# Patient Record
Sex: FEMALE | Race: WHITE | NOT HISPANIC OR LATINO | Employment: OTHER | URBAN - METROPOLITAN AREA
[De-identification: names, ages, dates, MRNs, and addresses within clinical notes are randomized per-mention and may not be internally consistent; named-entity substitution may affect disease eponyms.]

---

## 2022-08-31 ENCOUNTER — TRANSCRIBE ORDERS (OUTPATIENT)
Dept: ADMINISTRATIVE | Facility: HOSPITAL | Age: 67
End: 2022-08-31

## 2022-08-31 DIAGNOSIS — S83.241A TEAR OF MEDIAL MENISCUS OF RIGHT KNEE, CURRENT, UNSPECIFIED TEAR TYPE, INITIAL ENCOUNTER: Primary | ICD-10-CM

## 2023-05-31 ENCOUNTER — HOSPITAL ENCOUNTER (OUTPATIENT)
Dept: GENERAL RADIOLOGY | Facility: HOSPITAL | Age: 68
Discharge: HOME | End: 2023-05-31
Payer: MEDICARE

## 2023-05-31 ENCOUNTER — PRE-ADMISSION TESTING (OUTPATIENT)
Dept: PREADMISSION TESTING | Facility: HOSPITAL | Age: 68
End: 2023-05-31
Payer: MEDICARE

## 2023-05-31 VITALS — WEIGHT: 167.44 LBS | HEIGHT: 62 IN | BODY MASS INDEX: 30.81 KG/M2

## 2023-05-31 LAB
ANION GAP SERPL CALCULATED.3IONS-SCNC: 9 MMOL/L (ref 5–15)
BUN SERPL-MCNC: 19 MG/DL (ref 8–23)
BUN/CREAT SERPL: 29.2 (ref 7–25)
CALCIUM SPEC-SCNC: 9.6 MG/DL (ref 8.6–10.5)
CHLORIDE SERPL-SCNC: 105 MMOL/L (ref 98–107)
CO2 SERPL-SCNC: 27 MMOL/L (ref 22–29)
CREAT SERPL-MCNC: 0.65 MG/DL (ref 0.57–1)
CRP SERPL-MCNC: <0.3 MG/DL (ref 0–0.5)
DEPRECATED RDW RBC AUTO: 52.1 FL (ref 37–54)
EGFRCR SERPLBLD CKD-EPI 2021: 97.2 ML/MIN/1.73
ERYTHROCYTE [DISTWIDTH] IN BLOOD BY AUTOMATED COUNT: 14.2 % (ref 12.3–15.4)
ERYTHROCYTE [SEDIMENTATION RATE] IN BLOOD: 25 MM/HR (ref 0–30)
GLUCOSE SERPL-MCNC: 113 MG/DL (ref 65–99)
HBA1C MFR BLD: 6 % (ref 4.8–5.6)
HCT VFR BLD AUTO: 41.2 % (ref 34–46.6)
HGB BLD-MCNC: 13.2 G/DL (ref 12–15.9)
MCH RBC QN AUTO: 31.7 PG (ref 26.6–33)
MCHC RBC AUTO-ENTMCNC: 32 G/DL (ref 31.5–35.7)
MCV RBC AUTO: 98.8 FL (ref 79–97)
PLATELET # BLD AUTO: 439 10*3/MM3 (ref 140–450)
PMV BLD AUTO: 9.7 FL (ref 6–12)
POTASSIUM SERPL-SCNC: 4.7 MMOL/L (ref 3.5–5.2)
QT INTERVAL: 430 MS
QTC INTERVAL: 436 MS
RBC # BLD AUTO: 4.17 10*6/MM3 (ref 3.77–5.28)
SODIUM SERPL-SCNC: 141 MMOL/L (ref 136–145)
WBC NRBC COR # BLD: 10.26 10*3/MM3 (ref 3.4–10.8)

## 2023-05-31 PROCEDURE — 93005 ELECTROCARDIOGRAM TRACING: CPT

## 2023-05-31 PROCEDURE — 83036 HEMOGLOBIN GLYCOSYLATED A1C: CPT

## 2023-05-31 PROCEDURE — 36415 COLL VENOUS BLD VENIPUNCTURE: CPT

## 2023-05-31 PROCEDURE — 80048 BASIC METABOLIC PNL TOTAL CA: CPT

## 2023-05-31 PROCEDURE — 85027 COMPLETE CBC AUTOMATED: CPT

## 2023-05-31 PROCEDURE — 71046 X-RAY EXAM CHEST 2 VIEWS: CPT

## 2023-05-31 PROCEDURE — 86140 C-REACTIVE PROTEIN: CPT

## 2023-05-31 PROCEDURE — 85652 RBC SED RATE AUTOMATED: CPT

## 2023-05-31 RX ORDER — BISOPROLOL FUMARATE 5 MG/1
5 TABLET, FILM COATED ORAL DAILY
Status: ON HOLD | COMMUNITY
Start: 2023-05-21

## 2023-05-31 RX ORDER — SPIRONOLACTONE 25 MG/1
25 TABLET ORAL DAILY
Status: ON HOLD | COMMUNITY

## 2023-05-31 NOTE — PAT
Patient's surgeon called in a prescription for Benzol Peroxide 5% wash to [facility].  Patient instructed to  from [facility] pharmacy that was submitted electronically.  Verbal and written instructions given regarding proper use of the Benzoyl Peroxide wash were provided to patient and/or famlily during PAT visit. Patient/family also instructed to complete Benzol Peroxide checklist and return it to Pre-op on the day of surgery.  Patient and/or family verbalized understanding.      Additionally, reinforced with patient to acquire this prescription from the [facility] retail pharmacy before leaving the hospital after PAT visit due to the potential unavailability at local pharmacies.      Patient instructed to drink 20 ounces of Gatorade and it needs to be completed 1 hour (for Main OR patients) or 2 hours (scheduled  section & [facility] patients) before given arrival time for procedure (NO RED Gatorade)    Patient verbalized understanding.    Patient directed to Radiology Department for CXR after Pre Admission Testing Appointment.     Last pacemaker interrogation was 2022, 18% battery, SJM.  Please check in pre-op.

## 2023-06-06 ENCOUNTER — OFFICE VISIT (OUTPATIENT)
Dept: INTERNAL MEDICINE | Facility: CLINIC | Age: 68
End: 2023-06-06
Payer: MEDICARE

## 2023-06-06 VITALS
TEMPERATURE: 97.8 F | BODY MASS INDEX: 30.88 KG/M2 | HEIGHT: 62 IN | DIASTOLIC BLOOD PRESSURE: 76 MMHG | HEART RATE: 74 BPM | OXYGEN SATURATION: 98 % | WEIGHT: 167.8 LBS | SYSTOLIC BLOOD PRESSURE: 122 MMHG | RESPIRATION RATE: 14 BRPM

## 2023-06-06 DIAGNOSIS — Z95.0 PACEMAKER: ICD-10-CM

## 2023-06-06 DIAGNOSIS — Z85.3 HISTORY OF MALIGNANT NEOPLASM OF BREAST: ICD-10-CM

## 2023-06-06 DIAGNOSIS — I10 PRIMARY HYPERTENSION: Primary | ICD-10-CM

## 2023-06-06 DIAGNOSIS — Z95.810 AUTOMATIC IMPLANTABLE CARDIAC DEFIBRILLATOR IN SITU: ICD-10-CM

## 2023-06-06 DIAGNOSIS — M19.90 IDIOPATHIC OSTEOARTHRITIS: ICD-10-CM

## 2023-06-06 DIAGNOSIS — F39 MOOD DISORDER: ICD-10-CM

## 2023-06-06 DIAGNOSIS — M54.2 NECK PAIN: ICD-10-CM

## 2023-06-06 DIAGNOSIS — F41.1 GENERALIZED ANXIETY DISORDER: ICD-10-CM

## 2023-06-06 DIAGNOSIS — M47.812 CERVICAL SPONDYLOSIS: ICD-10-CM

## 2023-06-06 DIAGNOSIS — I10 ESSENTIAL HYPERTENSION: ICD-10-CM

## 2023-06-06 DIAGNOSIS — M25.511 RIGHT SHOULDER PAIN, UNSPECIFIED CHRONICITY: ICD-10-CM

## 2023-06-06 DIAGNOSIS — I42.0 NONISCHEMIC CONGESTIVE CARDIOMYOPATHY: ICD-10-CM

## 2023-06-06 PROBLEM — G89.29 CHRONIC RIGHT SHOULDER PAIN: Status: ACTIVE | Noted: 2023-06-06

## 2023-06-06 RX ORDER — DEXTROAMPHETAMINE SULFATE 10 MG/1
20 TABLET ORAL
Status: ON HOLD | COMMUNITY
Start: 2023-06-05

## 2023-06-06 RX ORDER — DEXTROAMPHETAMINE SULFATE 10 MG/1
20 CAPSULE, EXTENDED RELEASE ORAL EVERY MORNING
Status: ON HOLD | COMMUNITY
Start: 2023-06-05

## 2023-06-06 NOTE — PROGRESS NOTES
"    Office Note     Name: Sara Lorenz    : 1955     MRN: [id number]     Chief Complaint  Establish Care (Prior to joint replacement surgeries)    Subjective     History of Present Illness:  Sara Lorenz is a 67 y.o. female who presents today for establishing care and she is a previous patient of [facility].  She has chronic health problems including cardiomyopathy and osteoarthritis and hypertension and follows with a cardiologist and she has chronic joint problems and has had joint replacement surgery on her right shoulder and has a reverse joint replacement surgery scheduled for next week and will be having home health caring for her as she lives alone.  Her other chronic issues are stable and she has ADHD and anxiety and sees a psychiatrist.  She has a history of breast cancer years ago.  She has a pacemaker and defibrillator implanted.  Past medical history: As above  Social history: Negative tobacco, negative EtOH, she lives alone and is  her ex- is  and she has a special needs daughter lives in a group living facility and she has regular contact with her and she is retired from social work.    Review of Systems   Respiratory:  Negative for cough, shortness of breath and wheezing.    Cardiovascular:  Negative for chest pain and palpitations.   Gastrointestinal:  Negative for blood in stool, diarrhea and vomiting.   Genitourinary:  Negative for dysuria, flank pain and genital sores.     Objective     Vital Signs  /76 (BP Location: Left arm, Patient Position: Sitting, Cuff Size: Adult)   Pulse 74   Temp 97.8 °F (36.6 °C) (Infrared)   Resp 14   Ht 157.5 cm (62\")   Wt 76.1 kg (167 lb 12.8 oz)   SpO2 98%   BMI 30.69 kg/m²   Estimated body mass index is 30.69 kg/m² as calculated from the following:    Height as of this encounter: 157.5 cm (62\").    Weight as of this encounter: 76.1 kg (167 lb 12.8 oz).    [unfilled]    Physical Exam  Vitals and " nursing note reviewed.   HENT:      Head: Normocephalic and atraumatic.   Neck:      Vascular: No carotid bruit.   Cardiovascular:      Rate and Rhythm: Normal rate and regular rhythm.      Heart sounds: Normal heart sounds. No murmur heard.    No gallop.   Pulmonary:      Effort: Pulmonary effort is normal. No respiratory distress.      Breath sounds: No stridor. No wheezing, rhonchi or rales.   Musculoskeletal:      Cervical back: Normal range of motion and neck supple.      Right lower leg: No edema.      Left lower leg: No edema.   Lymphadenopathy:      Cervical: No cervical adenopathy.   Neurological:      Mental Status: She is alert.               Assessment and Plan     Diagnoses and all orders for this visit:    1. Primary hypertension (Primary) stable with current prescription medication and she will continue this    2. Generalized anxiety disorder stable with current prescription medication and she will continue this    3. History of malignant neoplasm of breast    4. Cervical spondylosis stable    5. Automatic implantable cardiac defibrillator in situ stable and is followed by cardiology    6. Essential hypertension stable    7. Mood disorder stable with current prescription medication and she will continue this    8. Idiopathic osteoarthritis stable    9. Neck pain stable    10. Nonischemic congestive cardiomyopathy stable and is followed by cardiology    11. Pacemaker stable and is due to have this replaced with an MRI compatible pacemaker and is due because her battery is due to be replaced also within the next year    12. Right shoulder pain, unspecified chronicity has reverse shoulder replacement surgery scheduled next week with Dr. Harmon and will be having home health caring for her afterwards          Follow Up  Return in about 3 months (around 9/6/2023) for Recheck.    Breanne Benites DO

## 2023-06-08 ENCOUNTER — TELEPHONE (OUTPATIENT)
Dept: INTERNAL MEDICINE | Facility: CLINIC | Age: 68
End: 2023-06-08
Payer: MEDICARE

## 2023-06-08 NOTE — TELEPHONE ENCOUNTER
Caller: Sara Lorenz    Relationship: Self    Best call back number: [phone]    What orders are you requesting (i.e. lab or imaging): CORATID ARTERY TEST     In what timeframe would the patient need to come in: AS SOON AS POSSIBLE     Where will you receive your lab/imaging services: AS CLOSE TO HER AS POSSIBLE    Additional notes: THE PATIENT STATES THAT SHE NEEDS TO HAVE THE TEST DONE BY MONDAY 06/12/23

## 2023-06-09 ENCOUNTER — PATIENT ROUNDING (BHMG ONLY) (OUTPATIENT)
Dept: INTERNAL MEDICINE | Facility: CLINIC | Age: 68
End: 2023-06-09
Payer: MEDICARE

## 2023-06-09 DIAGNOSIS — H54.7 VISION LOSS: Primary | ICD-10-CM

## 2023-06-09 DIAGNOSIS — H34.9 UNSPECIFIED RETINAL VASCULAR OCCLUSION: ICD-10-CM

## 2023-06-09 NOTE — PROGRESS NOTES
A [employer] message has been sent to the patient for patient rounding with [facility].

## 2023-06-09 NOTE — TELEPHONE ENCOUNTER
There would need to be a diagnosis in order to order the test-----what is the reason she is requesting it

## 2023-06-09 NOTE — TELEPHONE ENCOUNTER
Optometrist is concerned because she has been having episodes of eye sight loss. No explanation why this is going on. She is concerned this may be a carotid artery blocked.     Advised pt this will not be done by Monday.

## 2023-06-11 ENCOUNTER — ANESTHESIA EVENT (OUTPATIENT)
Dept: PERIOP | Facility: HOSPITAL | Age: 68
End: 2023-06-11
Payer: MEDICARE

## 2023-06-11 RX ORDER — FAMOTIDINE 10 MG/ML
20 INJECTION, SOLUTION INTRAVENOUS ONCE
Status: CANCELLED | OUTPATIENT
Start: 2023-06-11 | End: 2023-06-11

## 2023-06-11 RX ORDER — SODIUM CHLORIDE 0.9 % (FLUSH) 0.9 %
10 SYRINGE (ML) INJECTION EVERY 12 HOURS SCHEDULED
Status: CANCELLED | OUTPATIENT
Start: 2023-06-11

## 2023-06-12 ENCOUNTER — APPOINTMENT (OUTPATIENT)
Dept: GENERAL RADIOLOGY | Facility: HOSPITAL | Age: 68
End: 2023-06-12
Payer: MEDICARE

## 2023-06-12 ENCOUNTER — ANESTHESIA EVENT CONVERTED (OUTPATIENT)
Dept: ANESTHESIOLOGY | Facility: HOSPITAL | Age: 68
End: 2023-06-12
Payer: MEDICARE

## 2023-06-12 ENCOUNTER — ANESTHESIA (OUTPATIENT)
Dept: PERIOP | Facility: HOSPITAL | Age: 68
End: 2023-06-12
Payer: MEDICARE

## 2023-06-12 ENCOUNTER — HOSPITAL ENCOUNTER (INPATIENT)
Facility: HOSPITAL | Age: 68
LOS: 3 days | Discharge: HOME | End: 2023-06-15
Attending: ORTHOPAEDIC SURGERY | Admitting: ORTHOPAEDIC SURGERY
Payer: MEDICARE

## 2023-06-12 DIAGNOSIS — M25.519 SHOULDER PAIN: ICD-10-CM

## 2023-06-12 DIAGNOSIS — Z96.611 S/P REVERSE TOTAL SHOULDER ARTHROPLASTY, RIGHT: Primary | ICD-10-CM

## 2023-06-12 LAB — POTASSIUM SERPL-SCNC: 4 MMOL/L (ref 3.5–5.2)

## 2023-06-12 PROCEDURE — C1776 JOINT DEVICE (IMPLANTABLE): HCPCS | Performed by: ORTHOPAEDIC SURGERY

## 2023-06-12 PROCEDURE — C1889 IMPLANT/INSERT DEVICE, NOC: HCPCS | Performed by: ORTHOPAEDIC SURGERY

## 2023-06-12 PROCEDURE — A9270 NON-COVERED ITEM OR SERVICE: HCPCS | Performed by: ORTHOPAEDIC SURGERY

## 2023-06-12 PROCEDURE — C1713 ANCHOR/SCREW BN/BN,TIS/BN: HCPCS | Performed by: ORTHOPAEDIC SURGERY

## 2023-06-12 PROCEDURE — 87102 FUNGUS ISOLATION CULTURE: CPT | Performed by: ORTHOPAEDIC SURGERY

## 2023-06-12 PROCEDURE — 87116 MYCOBACTERIA CULTURE: CPT | Performed by: ORTHOPAEDIC SURGERY

## 2023-06-12 PROCEDURE — 25010000002 PHENYLEPHRINE 10 MG/ML SOLUTION 1 ML VIAL: Performed by: ANESTHESIOLOGY

## 2023-06-12 PROCEDURE — 63710000001 OXYCODONE 5 MG TABLET: Performed by: ORTHOPAEDIC SURGERY

## 2023-06-12 PROCEDURE — 63710000001 SERTRALINE 100 MG TABLET: Performed by: INTERNAL MEDICINE

## 2023-06-12 PROCEDURE — 25010000002 ONDANSETRON PER 1 MG: Performed by: ANESTHESIOLOGY

## 2023-06-12 PROCEDURE — 25010000002 VANCOMYCIN 10 G RECONSTITUTED SOLUTION: Performed by: ORTHOPAEDIC SURGERY

## 2023-06-12 PROCEDURE — 25010000002 PROPOFOL 10 MG/ML EMULSION: Performed by: ANESTHESIOLOGY

## 2023-06-12 PROCEDURE — 87176 TISSUE HOMOGENIZATION CULTR: CPT | Performed by: ORTHOPAEDIC SURGERY

## 2023-06-12 PROCEDURE — 25010000002 DEXAMETHASONE PER 1 MG: Performed by: ANESTHESIOLOGY

## 2023-06-12 PROCEDURE — 63710000001 POVIDONE-IODINE 10 % SOLUTION 30 ML BOTTLE: Performed by: ORTHOPAEDIC SURGERY

## 2023-06-12 PROCEDURE — 25010000002 SUGAMMADEX 200 MG/2ML SOLUTION: Performed by: ANESTHESIOLOGY

## 2023-06-12 PROCEDURE — 73030 X-RAY EXAM OF SHOULDER: CPT

## 2023-06-12 PROCEDURE — 25010000002 FENTANYL CITRATE (PF) 50 MCG/ML SOLUTION

## 2023-06-12 PROCEDURE — 25010000002 PHENYLEPHRINE 10 MG/ML SOLUTION: Performed by: ANESTHESIOLOGY

## 2023-06-12 PROCEDURE — 25010000002 ROPIVACAINE PER 1 MG: Performed by: NURSE ANESTHETIST, CERTIFIED REGISTERED

## 2023-06-12 PROCEDURE — 25010000002 CEFAZOLIN IN DEXTROSE 2-4 GM/100ML-% SOLUTION: Performed by: ORTHOPAEDIC SURGERY

## 2023-06-12 PROCEDURE — 87205 SMEAR GRAM STAIN: CPT | Performed by: ORTHOPAEDIC SURGERY

## 2023-06-12 PROCEDURE — 87075 CULTR BACTERIA EXCEPT BLOOD: CPT | Performed by: ORTHOPAEDIC SURGERY

## 2023-06-12 PROCEDURE — 87070 CULTURE OTHR SPECIMN AEROBIC: CPT | Performed by: ORTHOPAEDIC SURGERY

## 2023-06-12 PROCEDURE — A9270 NON-COVERED ITEM OR SERVICE: HCPCS | Performed by: INTERNAL MEDICINE

## 2023-06-12 PROCEDURE — 25010000002 DEXAMETHASONE SODIUM PHOSPHATE 10 MG/ML SOLUTION: Performed by: NURSE ANESTHETIST, CERTIFIED REGISTERED

## 2023-06-12 PROCEDURE — 84132 ASSAY OF SERUM POTASSIUM: CPT | Performed by: ANESTHESIOLOGY

## 2023-06-12 PROCEDURE — 87206 SMEAR FLUORESCENT/ACID STAI: CPT | Performed by: ORTHOPAEDIC SURGERY

## 2023-06-12 RX ORDER — SERTRALINE HYDROCHLORIDE 100 MG/1
100 TABLET, FILM COATED ORAL DAILY
COMMUNITY

## 2023-06-12 RX ORDER — DEXAMETHASONE SODIUM PHOSPHATE 4 MG/ML
INJECTION, SOLUTION INTRA-ARTICULAR; INTRALESIONAL; INTRAMUSCULAR; INTRAVENOUS; SOFT TISSUE AS NEEDED
Status: DISCONTINUED | OUTPATIENT
Start: 2023-06-12 | End: 2023-06-12 | Stop reason: SURG

## 2023-06-12 RX ORDER — NALOXONE HCL 0.4 MG/ML
0.4 VIAL (ML) INJECTION AS NEEDED
Status: DISCONTINUED | OUTPATIENT
Start: 2023-06-12 | End: 2023-06-12 | Stop reason: HOSPADM

## 2023-06-12 RX ORDER — HYDROMORPHONE HYDROCHLORIDE 1 MG/ML
0.5 INJECTION, SOLUTION INTRAMUSCULAR; INTRAVENOUS; SUBCUTANEOUS
Status: DISCONTINUED | OUTPATIENT
Start: 2023-06-12 | End: 2023-06-15 | Stop reason: HOSPADM

## 2023-06-12 RX ORDER — EPHEDRINE SULFATE 50 MG/ML
5 INJECTION, SOLUTION INTRAVENOUS ONCE AS NEEDED
Status: DISCONTINUED | OUTPATIENT
Start: 2023-06-12 | End: 2023-06-12 | Stop reason: HOSPADM

## 2023-06-12 RX ORDER — BUPIVACAINE HYDROCHLORIDE 2.5 MG/ML
INJECTION, SOLUTION EPIDURAL; INFILTRATION; INTRACAUDAL
Status: COMPLETED | OUTPATIENT
Start: 2023-06-12 | End: 2023-06-12

## 2023-06-12 RX ORDER — ACETAMINOPHEN 650 MG/1
650 SUPPOSITORY RECTAL EVERY 4 HOURS PRN
Status: DISCONTINUED | OUTPATIENT
Start: 2023-06-12 | End: 2023-06-15 | Stop reason: HOSPADM

## 2023-06-12 RX ORDER — TRANEXAMIC ACID 10 MG/ML
1000 INJECTION, SOLUTION INTRAVENOUS ONCE
Status: COMPLETED | OUTPATIENT
Start: 2023-06-12 | End: 2023-06-12

## 2023-06-12 RX ORDER — PHENYLEPHRINE HYDROCHLORIDE 10 MG/ML
INJECTION INTRAVENOUS AS NEEDED
Status: DISCONTINUED | OUTPATIENT
Start: 2023-06-12 | End: 2023-06-12 | Stop reason: SURG

## 2023-06-12 RX ORDER — DROPERIDOL 2.5 MG/ML
0.62 INJECTION, SOLUTION INTRAMUSCULAR; INTRAVENOUS ONCE
Status: DISCONTINUED | OUTPATIENT
Start: 2023-06-12 | End: 2023-06-15 | Stop reason: HOSPADM

## 2023-06-12 RX ORDER — FENTANYL CITRATE 50 UG/ML
INJECTION, SOLUTION INTRAMUSCULAR; INTRAVENOUS
Status: COMPLETED
Start: 2023-06-12 | End: 2023-06-12

## 2023-06-12 RX ORDER — ROCURONIUM BROMIDE 10 MG/ML
INJECTION, SOLUTION INTRAVENOUS AS NEEDED
Status: DISCONTINUED | OUTPATIENT
Start: 2023-06-12 | End: 2023-06-12 | Stop reason: SURG

## 2023-06-12 RX ORDER — ONDANSETRON 2 MG/ML
INJECTION INTRAMUSCULAR; INTRAVENOUS AS NEEDED
Status: DISCONTINUED | OUTPATIENT
Start: 2023-06-12 | End: 2023-06-12 | Stop reason: SURG

## 2023-06-12 RX ORDER — FENTANYL CITRATE 50 UG/ML
50 INJECTION, SOLUTION INTRAMUSCULAR; INTRAVENOUS
Status: DISCONTINUED | OUTPATIENT
Start: 2023-06-12 | End: 2023-06-12 | Stop reason: HOSPADM

## 2023-06-12 RX ORDER — FAMOTIDINE 20 MG/1
20 TABLET, FILM COATED ORAL ONCE
Status: COMPLETED | OUTPATIENT
Start: 2023-06-12 | End: 2023-06-12

## 2023-06-12 RX ORDER — LIDOCAINE HYDROCHLORIDE 10 MG/ML
0.5 INJECTION, SOLUTION EPIDURAL; INFILTRATION; INTRACAUDAL; PERINEURAL ONCE AS NEEDED
Status: COMPLETED | OUTPATIENT
Start: 2023-06-12 | End: 2023-06-12

## 2023-06-12 RX ORDER — ACETAMINOPHEN 325 MG/1
650 TABLET ORAL EVERY 4 HOURS PRN
Status: DISCONTINUED | OUTPATIENT
Start: 2023-06-12 | End: 2023-06-15 | Stop reason: HOSPADM

## 2023-06-12 RX ORDER — SERTRALINE HYDROCHLORIDE 100 MG/1
100 TABLET, FILM COATED ORAL DAILY
Status: DISCONTINUED | OUTPATIENT
Start: 2023-06-12 | End: 2023-06-15 | Stop reason: HOSPADM

## 2023-06-12 RX ORDER — LIDOCAINE HYDROCHLORIDE 10 MG/ML
INJECTION, SOLUTION EPIDURAL; INFILTRATION; INTRACAUDAL; PERINEURAL AS NEEDED
Status: DISCONTINUED | OUTPATIENT
Start: 2023-06-12 | End: 2023-06-12 | Stop reason: SURG

## 2023-06-12 RX ORDER — MIDAZOLAM HYDROCHLORIDE 1 MG/ML
0.5 INJECTION INTRAMUSCULAR; INTRAVENOUS
Status: DISCONTINUED | OUTPATIENT
Start: 2023-06-12 | End: 2023-06-12 | Stop reason: HOSPADM

## 2023-06-12 RX ORDER — OXYCODONE HYDROCHLORIDE 10 MG/1
5 TABLET ORAL EVERY 4 HOURS PRN
Status: DISCONTINUED | OUTPATIENT
Start: 2023-06-12 | End: 2023-06-15 | Stop reason: HOSPADM

## 2023-06-12 RX ORDER — LABETALOL HYDROCHLORIDE 5 MG/ML
10 INJECTION, SOLUTION INTRAVENOUS EVERY 4 HOURS PRN
Status: DISCONTINUED | OUTPATIENT
Start: 2023-06-12 | End: 2023-06-15 | Stop reason: HOSPADM

## 2023-06-12 RX ORDER — ONDANSETRON 4 MG/1
4 TABLET, FILM COATED ORAL EVERY 6 HOURS PRN
Status: DISCONTINUED | OUTPATIENT
Start: 2023-06-12 | End: 2023-06-15 | Stop reason: HOSPADM

## 2023-06-12 RX ORDER — SODIUM CHLORIDE 0.9 % (FLUSH) 0.9 %
10 SYRINGE (ML) INJECTION AS NEEDED
Status: DISCONTINUED | OUTPATIENT
Start: 2023-06-12 | End: 2023-06-12 | Stop reason: HOSPADM

## 2023-06-12 RX ORDER — SODIUM CHLORIDE, SODIUM LACTATE, POTASSIUM CHLORIDE, CALCIUM CHLORIDE 600; 310; 30; 20 MG/100ML; MG/100ML; MG/100ML; MG/100ML
9 INJECTION, SOLUTION INTRAVENOUS CONTINUOUS
Status: DISCONTINUED | OUTPATIENT
Start: 2023-06-12 | End: 2023-06-15 | Stop reason: HOSPADM

## 2023-06-12 RX ORDER — ROPIVACAINE HYDROCHLORIDE 2 MG/ML
INJECTION, SOLUTION EPIDURAL; INFILTRATION; PERINEURAL CONTINUOUS
Status: DISCONTINUED | OUTPATIENT
Start: 2023-06-12 | End: 2023-06-15 | Stop reason: HOSPADM

## 2023-06-12 RX ORDER — PROPOFOL 10 MG/ML
VIAL (ML) INTRAVENOUS AS NEEDED
Status: DISCONTINUED | OUTPATIENT
Start: 2023-06-12 | End: 2023-06-12 | Stop reason: SURG

## 2023-06-12 RX ORDER — EPHEDRINE SULFATE 50 MG/ML
INJECTION INTRAVENOUS AS NEEDED
Status: DISCONTINUED | OUTPATIENT
Start: 2023-06-12 | End: 2023-06-12 | Stop reason: SURG

## 2023-06-12 RX ORDER — ONDANSETRON 2 MG/ML
4 INJECTION INTRAMUSCULAR; INTRAVENOUS ONCE AS NEEDED
Status: DISCONTINUED | OUTPATIENT
Start: 2023-06-12 | End: 2023-06-12 | Stop reason: HOSPADM

## 2023-06-12 RX ORDER — BISOPROLOL FUMARATE 5 MG/1
5 TABLET, FILM COATED ORAL DAILY
Status: DISCONTINUED | OUTPATIENT
Start: 2023-06-13 | End: 2023-06-15 | Stop reason: HOSPADM

## 2023-06-12 RX ORDER — DEXAMETHASONE SODIUM PHOSPHATE 10 MG/ML
INJECTION, SOLUTION INTRAMUSCULAR; INTRAVENOUS
Status: COMPLETED | OUTPATIENT
Start: 2023-06-12 | End: 2023-06-12

## 2023-06-12 RX ORDER — ACETAMINOPHEN 500 MG
1000 TABLET ORAL ONCE
Status: COMPLETED | OUTPATIENT
Start: 2023-06-12 | End: 2023-06-12

## 2023-06-12 RX ORDER — ESMOLOL HYDROCHLORIDE 10 MG/ML
INJECTION INTRAVENOUS AS NEEDED
Status: DISCONTINUED | OUTPATIENT
Start: 2023-06-12 | End: 2023-06-12 | Stop reason: SURG

## 2023-06-12 RX ORDER — CEFAZOLIN SODIUM 2 G/100ML
2 INJECTION, SOLUTION INTRAVENOUS ONCE
Status: COMPLETED | OUTPATIENT
Start: 2023-06-12 | End: 2023-06-12

## 2023-06-12 RX ORDER — SODIUM CHLORIDE, SODIUM LACTATE, POTASSIUM CHLORIDE, CALCIUM CHLORIDE 600; 310; 30; 20 MG/100ML; MG/100ML; MG/100ML; MG/100ML
100 INJECTION, SOLUTION INTRAVENOUS CONTINUOUS
Status: DISCONTINUED | OUTPATIENT
Start: 2023-06-12 | End: 2023-06-15 | Stop reason: HOSPADM

## 2023-06-12 RX ORDER — SODIUM CHLORIDE 450 MG/100ML
50 INJECTION, SOLUTION INTRAVENOUS CONTINUOUS
Status: DISCONTINUED | OUTPATIENT
Start: 2023-06-12 | End: 2023-06-15 | Stop reason: HOSPADM

## 2023-06-12 RX ORDER — ONDANSETRON 2 MG/ML
4 INJECTION INTRAMUSCULAR; INTRAVENOUS EVERY 6 HOURS PRN
Status: DISCONTINUED | OUTPATIENT
Start: 2023-06-12 | End: 2023-06-15 | Stop reason: HOSPADM

## 2023-06-12 RX ORDER — SODIUM CHLORIDE, SODIUM LACTATE, POTASSIUM CHLORIDE, CALCIUM CHLORIDE 600; 310; 30; 20 MG/100ML; MG/100ML; MG/100ML; MG/100ML
INJECTION, SOLUTION INTRAVENOUS CONTINUOUS PRN
Status: DISCONTINUED | OUTPATIENT
Start: 2023-06-12 | End: 2023-06-12 | Stop reason: SURG

## 2023-06-12 RX ORDER — NALOXONE HCL 0.4 MG/ML
0.1 VIAL (ML) INJECTION
Status: DISCONTINUED | OUTPATIENT
Start: 2023-06-12 | End: 2023-06-15 | Stop reason: HOSPADM

## 2023-06-12 RX ORDER — MAGNESIUM HYDROXIDE 1200 MG/15ML
LIQUID ORAL AS NEEDED
Status: DISCONTINUED | OUTPATIENT
Start: 2023-06-12 | End: 2023-06-12 | Stop reason: HOSPADM

## 2023-06-12 RX ORDER — SODIUM CHLORIDE 9 MG/ML
40 INJECTION, SOLUTION INTRAVENOUS AS NEEDED
Status: DISCONTINUED | OUTPATIENT
Start: 2023-06-12 | End: 2023-06-12 | Stop reason: HOSPADM

## 2023-06-12 RX ADMIN — SODIUM CHLORIDE 50 ML/HR: 4.5 INJECTION, SOLUTION INTRAVENOUS at 19:34

## 2023-06-12 RX ADMIN — PHENYLEPHRINE HYDROCHLORIDE 100 MCG: 10 INJECTION INTRAVENOUS at 13:49

## 2023-06-12 RX ADMIN — LIDOCAINE HYDROCHLORIDE 50 MG: 10 INJECTION, SOLUTION EPIDURAL; INFILTRATION; INTRACAUDAL; PERINEURAL at 13:42

## 2023-06-12 RX ADMIN — SERTRALINE HYDROCHLORIDE 100 MG: 100 TABLET ORAL at 18:30

## 2023-06-12 RX ADMIN — FAMOTIDINE 20 MG: 20 TABLET ORAL at 10:15

## 2023-06-12 RX ADMIN — EPHEDRINE SULFATE 10 MG: 50 INJECTION INTRAVENOUS at 15:01

## 2023-06-12 RX ADMIN — CEFAZOLIN SODIUM 2 G: 2 INJECTION, SOLUTION INTRAVENOUS at 12:23

## 2023-06-12 RX ADMIN — EPHEDRINE SULFATE 5 MG: 50 INJECTION INTRAVENOUS at 13:49

## 2023-06-12 RX ADMIN — EPHEDRINE SULFATE 5 MG: 50 INJECTION INTRAVENOUS at 13:47

## 2023-06-12 RX ADMIN — SODIUM CHLORIDE, POTASSIUM CHLORIDE, SODIUM LACTATE AND CALCIUM CHLORIDE 100 ML/HR: 600; 310; 30; 20 INJECTION, SOLUTION INTRAVENOUS at 18:31

## 2023-06-12 RX ADMIN — DEXAMETHASONE SODIUM PHOSPHATE 6 MG: 4 INJECTION, SOLUTION INTRAMUSCULAR; INTRAVENOUS at 13:50

## 2023-06-12 RX ADMIN — SODIUM CHLORIDE, POTASSIUM CHLORIDE, SODIUM LACTATE AND CALCIUM CHLORIDE 9 ML/HR: 600; 310; 30; 20 INJECTION, SOLUTION INTRAVENOUS at 10:14

## 2023-06-12 RX ADMIN — PROPOFOL 10 MG: 10 INJECTION, EMULSION INTRAVENOUS at 15:49

## 2023-06-12 RX ADMIN — TRANEXAMIC ACID 1000 MG: 10 INJECTION, SOLUTION INTRAVENOUS at 15:10

## 2023-06-12 RX ADMIN — FENTANYL CITRATE 50 MCG: 50 INJECTION, SOLUTION INTRAMUSCULAR; INTRAVENOUS at 17:23

## 2023-06-12 RX ADMIN — PROPOFOL 20 MG: 10 INJECTION, EMULSION INTRAVENOUS at 15:57

## 2023-06-12 RX ADMIN — PHENYLEPHRINE HYDROCHLORIDE 100 MCG: 10 INJECTION INTRAVENOUS at 13:47

## 2023-06-12 RX ADMIN — ACETAMINOPHEN 1000 MG: 500 TABLET ORAL at 10:15

## 2023-06-12 RX ADMIN — BUPIVACAINE HYDROCHLORIDE 30 ML: 2.5 INJECTION, SOLUTION EPIDURAL; INFILTRATION; INTRACAUDAL; PERINEURAL at 11:39

## 2023-06-12 RX ADMIN — PROPOFOL 20 MG: 10 INJECTION, EMULSION INTRAVENOUS at 15:54

## 2023-06-12 RX ADMIN — EPHEDRINE SULFATE 5 MG: 50 INJECTION INTRAVENOUS at 15:58

## 2023-06-12 RX ADMIN — ROCURONIUM BROMIDE 50 MG: 10 SOLUTION INTRAVENOUS at 13:43

## 2023-06-12 RX ADMIN — PROPOFOL 25 MCG/KG/MIN: 10 INJECTION, EMULSION INTRAVENOUS at 13:50

## 2023-06-12 RX ADMIN — TRANEXAMIC ACID 1000 MG: 10 INJECTION, SOLUTION INTRAVENOUS at 13:46

## 2023-06-12 RX ADMIN — VANCOMYCIN HYDROCHLORIDE 1250 MG: 10 INJECTION, POWDER, LYOPHILIZED, FOR SOLUTION INTRAVENOUS at 12:53

## 2023-06-12 RX ADMIN — PROPOFOL 200 MG: 10 INJECTION, EMULSION INTRAVENOUS at 13:42

## 2023-06-12 RX ADMIN — ONDANSETRON 4 MG: 2 INJECTION INTRAMUSCULAR; INTRAVENOUS at 15:40

## 2023-06-12 RX ADMIN — PHENYLEPHRINE HYDROCHLORIDE 0.2 MCG/KG/MIN: 10 INJECTION INTRAVENOUS at 13:50

## 2023-06-12 RX ADMIN — LIDOCAINE HYDROCHLORIDE 0.5 ML: 10 INJECTION, SOLUTION EPIDURAL; INFILTRATION; INTRACAUDAL; PERINEURAL at 10:14

## 2023-06-12 RX ADMIN — OXYCODONE HYDROCHLORIDE 5 MG: 10 TABLET ORAL at 19:35

## 2023-06-12 RX ADMIN — SUGAMMADEX 200 MG: 100 INJECTION, SOLUTION INTRAVENOUS at 15:40

## 2023-06-12 RX ADMIN — Medication 1000 MG: at 16:20

## 2023-06-12 RX ADMIN — ESMOLOL HYDROCHLORIDE 40 MG: 10 INJECTION, SOLUTION INTRAVENOUS at 13:42

## 2023-06-12 RX ADMIN — SODIUM CHLORIDE, POTASSIUM CHLORIDE, SODIUM LACTATE AND CALCIUM CHLORIDE: 600; 310; 30; 20 INJECTION, SOLUTION INTRAVENOUS at 13:29

## 2023-06-12 RX ADMIN — PHENYLEPHRINE HYDROCHLORIDE 100 MCG: 10 INJECTION INTRAVENOUS at 14:11

## 2023-06-12 RX ADMIN — BUPIVACAINE HYDROCHLORIDE 10 ML: 2.5 INJECTION, SOLUTION EPIDURAL; INFILTRATION; INTRACAUDAL at 11:14

## 2023-06-12 RX ADMIN — DEXAMETHASONE SODIUM PHOSPHATE 2 MG: 10 INJECTION, SOLUTION INTRAMUSCULAR; INTRAVENOUS at 11:39

## 2023-06-12 RX ADMIN — PHENYLEPHRINE HYDROCHLORIDE 100 MCG: 10 INJECTION INTRAVENOUS at 15:58

## 2023-06-12 NOTE — ANESTHESIA PROCEDURE NOTES
PECS I/II Block    Pre-sedation assessment completed: 6/12/2023 11:39 AM    Patient reassessed immediately prior to procedure    Patient location during procedure: OR  Start time: 6/12/2023 11:39 AM  Reason for block: at surgeon's request and post-op pain management  Performed by  CRNA/CAA: Rex Stevens, CRNA  Assisted by: Varsha Blackwood RN  Preanesthetic Checklist  Completed: patient identified, IV checked, site marked, risks and benefits discussed, surgical consent, monitors and equipment checked, pre-op evaluation and timeout performed  Prep:  Pt Position: supine  Sterile barriers:cap, gloves, mask and washed/disinfected hands  Prep: ChloraPrep  Patient monitoring: blood pressure monitoring, continuous pulse oximetry and EKG  Procedure  Performed under: general  Guidance:ultrasound guided and landmark technique  Images:still images obtained, printed/placed on chart    Laterality:right  Block Type:PECS I and PECS II  Injection Technique:single-shot  Needle Type:short-bevel  Needle Gauge:20 G  Resistance on Injection: none    Medications Used: bupivacaine PF (MARCAINE) 0.25 % injection - Injection   30 mL - 6/12/2023 11:39:00 AM  dexamethasone sodium phosphate injection - Injection   2 mg - 6/12/2023 11:39:00 AM      Medications  Preservative Free Saline:10ml    Post Assessment  Injection Assessment: negative aspiration for heme, incremental injection and no paresthesia on injection  Patient Tolerance:comfortable throughout block  Complications:no  Additional Notes  Interpectoral-Pectoserratus Plane   A high-frequency linear transducer, with sterile cover, was placed medial to the coracoid process in the paramedian sagittal plane. The transducer was moved caudally to the 4th rib and rotated slightly to allow an in-plane needle trajectory from medial to lateral. Pectoralis Major Muscle (PMM), Pectoralis Minor Muscle (PmM), Thoracoacromial Artery, Ribs, and Pleura were identified under ultrasound. The insertion  "site was prepped in sterile fashion and then localized with 2-5 ml of 1% Lidocaine. Using ultrasound-guidance, a 20-gauge B-Issa 4\" Ultraplex 360 non-stimulating echogenic needle was advanced in plane until the tip of the needle was in the fascial plane between the PMM and PmM, lateral to the Thoracoacromial Artery. 1-3ml of preservative free normal saline was used to hydro-dissect the fascial planes. After the fascial plane was verified, 10ml local anesthetic (LA) was injected for Interpectoral fascial plane block. The needle was continued along the same path to the level of the 4th rib below PmM.  Initially preservative free normal saline was used to confirm needle position and then 20 ml of LA was injected for Pectoserratus fascial plane block. Aspiration every 5 ml to prevent intravascular injection. Injection was completed with negative aspiration of blood and negative intravascular injection. Injection pressures were normal with minimal resistance.             "

## 2023-06-12 NOTE — ANESTHESIA PREPROCEDURE EVALUATION
Anesthesia Evaluation     Patient summary reviewed and Nursing notes reviewed   history of anesthetic complications:  PONV  NPO Solid Status: > 8 hours  NPO Liquid Status: > 2 hours           Airway   Mallampati: II  TM distance: >3 FB  Neck ROM: full  No difficulty expected  Dental    (+) upper dentures and partials    Pulmonary - normal exam    breath sounds clear to auscultation  Cardiovascular - normal exam    ECG reviewed    (+) pacemaker pacemaker interrogated <1 month ago, hypertensionCHF (h/o)   (-) dysrhythmias, angina, orthopnea, murmur    ROS comment: ECHO 7/2017 EF=50% per Dr. Crespo's note.    Saw cardiology in March of this year and stable (see note)    Neuro/Psych  (+) TIA  (-) seizures, CVA  GI/Hepatic/Renal/Endo    (+) obesity  (-) no renal disease, diabetes, no thyroid disorder    Musculoskeletal     (+) neck pain  Abdominal    Substance History      OB/GYN          Other   arthritis,                   Anesthesia Plan    ASA 3     general with block     intravenous induction     Anesthetic plan, risks, benefits, and alternatives have been provided, discussed and informed consent has been obtained with: patient.    Plan discussed with CRNA.    CODE STATUS:

## 2023-06-12 NOTE — OP NOTE
DATE OF OPERATION: 06/12/23  PREOPERATIVE DIAGNOSIS: 1. Status post right shoulder hemiarthroplasty  2.  Right rotator cuff tear  3.  Right shoulder rotator cuff tear arthropathy  POSTOPERATIVE DIAGNOSES:  1. Status post right shoulder hemiarthroplasty  2.  Right rotator cuff tear  3.  Right shoulder rotator cuff tear arthropathy  PROCEDURES PERFORMED:  1. Right Revision hemiarthroplasty to reverse total shoulder arthroplasty.    SURGEON: Caleb Harmon MD  ASSISTANTS:  1. Kaiser James MD, PGY-5  ANESTHESIA: General plus block.      SURGICAL APPROACH: Deltopectoral      SURGICAL TECHNIQUE: Peel off     ESTIMATED BLOOD LOSS:400mL.    COMPLICATIONS: None.    DISPOSITION: Recovery room in stable condition.    IMPLANTS: Exactech Equinoxe reverse total shoulder system, HRP: 8 x 80 mm stem cemented, 22.5mm collar, 12.5 small proximal humerus, 0 metal liner tray, 38 x 0 polyethylene tray, standard baseplate with 4 screws with locking caps, and a 38mm glenosphere.    INDICATIONS: This is a 67-year-old Female with Right shoulder pain and limited function and motion.  Preoperative work-up was consistent with Painful hemiarthroplasty with glenoid erosion and chronic rotator cuff tear.  She had significant bony resorption around the hemiarthroplasty which was done 10 years prior for avascular necrosis.  The lateral cortex of the humerus had completely resorbed. After a discussion of risks, benefits, and alternatives, wished to proceed with revision shoulder arthroplasty.  DESCRIPTION OF PROCEDURE: On the day of surgery, the patient identified the right shoulder as the correct operative extremity. This was initialed by the surgeon with the patients's acknowledgment. The patient underwent placement of an interscalene block and was taken to the operating room and placed in the supine position. Upon induction of adequate anesthesia, the patient was brought up to the beach chair position and the shoulder and upper extremity  were prepped and draped in the usual sterile fashion. Timeout confirmed the correct patient and operative extremity as well as that antibiotics were on board.  The previous deltopectoral approach to the shoulder was utilized. It was carried sharply through the skin and subcutaneous tissue. Medial and lateral flaps were developed over the deltopectoral fascia. The cephalic vein was identified but was injured and therefore ligated during the procedureand the deltopectoral interval was developed and the subdeltoid and subpectoral spaces were mobilized and a blunt retractor was placed deep to this. The clavipectoral fascia was opened on the lateral edge of the conjoined tendon and the retractor was moved deep to this. The rotator interval was opened by following the bicipital groove.  A subscapularis peel was performed directly off the humerus exposing the humerus head and joint.The inferior capsule was then released directly off the humerus to allow greater than 90° of external rotation.  The humeral head was identified and multiple soft tissue samples were obtained for tissue cultures.  The humeral head was removed and the stem was tested and found to be secure.  Unfortunately this is not a convertible stem.  The stem was attempted to be removed and was very well fixed.  The entire lateral cortex of the humerus was missing almost from the deltoid insertion with direct visualization of the stem yet there was good fixation on the medial cortex.  An osteotome was used to try and loosen the stem but this was unsuccessful as well.  Ultimately given the bone loss osteotomy was essentially performed and the stem was finally able to be removed.  At this point the bone loss was significant and it was clear that this would require metal augmentation and use of a tumor type prosthesis.  The glenoid was exposed.  Circumferential labral excision and capsular release were performed.  The glenoid guide was placed and the glenoid was  reamed and then the large central hole for the baseplate was drilled and the cage glenoid baseplate impacted in.  Next, the inferior screw hole was drilled and a screw placed with acceptable purchase.  Next, an gorge-inferior screw was placed, then a postero-inferior screw, then a superior screw placed with acceptable purchase in all. The locking caps were placed.  The glenosphere was then inserted and locked into place with a set screw.  The humerus was carefully subluxed back anteriorly.  The humeral canal was reamed and trialing was carried out.  Fortunately the deltoid insertion was intact and the implant was constructed to fit beyond this.  The humerus was reamed and a cement restrictor was inserted once the final stem sizes were chosen.  The implant was assembled and the cement inserted and the stem inserted as well and the shoulder was then reduced.  The implant was constructed in approximately 30 degrees of retroversion.  This was held still until the cement cured.  This allowed nearly full passive range of motion with no instability. The joint was copiously irrigated with orthopedic irrigation mixed with Betadine after the final implants were assembled and locked into place.  Passive range range of motion will be full elevation but external rotation will be limited to 30° in the perioperative period. The deltopectoral interval was approximated with 0 Vicryl, the subcutaneous tissue with 2-0 Vicryl, and the skin with Monocryl and Dermabond. A sterile dressing was placed. Anesthesia was reversed and the patient was taken to the recovery room in stable condition. All instrument, needle, and sponge counts were correct.

## 2023-06-12 NOTE — ANESTHESIA POSTPROCEDURE EVALUATION
Patient: Sara Lorenz    Procedure Summary       Date: 06/12/23 Room / Location:  [facility] /  [facility]    Anesthesia Start: 1329 Anesthesia Stop: 1621    Procedure: REVISION RIGHT SHOULDER HEMIARTHROPLASTY TO REVERSE TOTAL SHOULDER ARTHROPLASTY, REVISION OF BOTH COMPONENTS (Right: Shoulder) Diagnosis:       Rotator cuff tear arthropathy, right      Complete tear of right rotator cuff      Status post right shoulder hemiarthroplasty      (Rotator cuff tear arthropathy, right [[id number]])    Surgeons: Caleb Harmon MD Provider: Osito Connolly MD    Anesthesia Type: general with block ASA Status: 3            Anesthesia Type: general with block    Vitals  Vitals Value Taken Time   /84    Temp 97    Pulse 59 06/12/23 1619   Resp 12    SpO2 99 % 06/12/23 1619   Vitals shown include unvalidated device data.        Post Anesthesia Care and Evaluation    Patient location during evaluation: PACU  Patient participation: complete - patient participated  Level of consciousness: sleepy but conscious  Pain score: 0  Pain management: adequate    Airway patency: patent  Anesthetic complications: No anesthetic complications  PONV Status: none  Cardiovascular status: hemodynamically stable and acceptable  Respiratory status: nonlabored ventilation, acceptable, nasal cannula and oral airway  Hydration status: acceptable

## 2023-06-12 NOTE — ANESTHESIA PROCEDURE NOTES
Airway  Urgency: elective    Date/Time: 6/12/2023 1:42 PM  Airway not difficult    General Information and Staff    Patient location during procedure: OR  SRNA: Kasey Sandra SRNA  Indications and Patient Condition  Indications for airway management: airway protection    Preoxygenated: yes  MILS not maintained throughout  Mask difficulty assessment: 2 - vent by mask + OA or adjuvant +/- NMBA    Final Airway Details  Final airway type: endotracheal airway      Successful airway: ETT  Cuffed: yes   Successful intubation technique: direct laryngoscopy  Endotracheal tube insertion site: oral  Blade: Knox  Blade size: 3  ETT size (mm): 7.0  Cormack-Lehane Classification: grade I - full view of glottis  Placement verified by: chest auscultation and capnometry   Cuff volume (mL): 8  Measured from: lips  ETT/EBT  to lips (cm): 21  Number of attempts at approach: 1  Assessment: lips, teeth, and gum same as pre-op and atraumatic intubation    Additional Comments  Negative epigastric sounds, Breath sound equal bilaterally with symmetric chest rise and fall

## 2023-06-12 NOTE — ANESTHESIA PROCEDURE NOTES
Interscalene Block    Pre-sedation assessment completed: 6/12/2023 11:14 AM    Patient reassessed immediately prior to procedure    Patient location during procedure: pre-op  Start time: 6/12/2023 11:14 AM  Reason for block: at surgeon's request and post-op pain management  Performed by  CRNA/CAA: Rex Stevens, CRNA  Assisted by: Varsha Blackwood RN  Preanesthetic Checklist  Completed: patient identified, IV checked, site marked, risks and benefits discussed, surgical consent, monitors and equipment checked, pre-op evaluation and timeout performed  Prep:  Sterile barriers:cap, gloves, mask and washed/disinfected hands  Prep: ChloraPrep  Patient monitoring: blood pressure monitoring, continuous pulse oximetry and EKG  Procedure    Sedation: yes  Performed under: local infiltration  Guidance:ultrasound guided    ULTRASOUND INTERPRETATION.  Using ultrasound guidance a 20 G gauge needle was placed in close proximity to the nerve, at which point, under ultrasound guidance anesthetic was injected in the area of the nerve and spread of the anesthesia was seen on ultrasound in close proximity thereto.  There were no abnormalities seen on ultrasound; a digital image was taken; and the patient tolerated the procedure with no complications. Images:still images obtained, printed/placed on chart    Laterality:right  Block Type:interscalene  Injection Technique:catheter  Needle Type:Tuohy and echogenic  Needle Gauge:18 G  Resistance on Injection: none  Catheter Size:20 G (20g)  Cath Depth at skin: 7 cm    Medications Used: bupivacaine PF (MARCAINE) 0.25 % injection - Injection   10 mL - 6/12/2023 11:14:00 AM      Medications  Preservative Free Saline:10ml    Post Assessment  Injection Assessment: negative aspiration for heme, no paresthesia on injection and incremental injection  Patient Tolerance:comfortable throughout block  Complications:no  Additional Notes  CATHETER  A high-frequency linear transducer, with sterile  "cover, was placed in the supraclavicular fossa to identify the subclavian artery and trunks and divisions of the brachial plexus. The transducer was then moved in a cephalad orientation with a slight rotation to continue visualization of the brachial plexus from the trunks and divisions, on to the C5-C7 roots. The insertion site was prepped and draped in sterile fashion. Skin and cutaneous tissue was infiltrated with 2-5 ml of 1% Lidocaine. Using ultrasound-guidance, an 18-gauge Contiplex Ultra 360 Touhy needle was advanced in plane from lateral to medial. Preservative-free normal saline was utilized for hydro-dissection of tissue, advancement of Touhy, and to confirm final needle placement at the fascial plane between the middle scalene muscle and sheath of the brachial plexus (C5-C7). A 20-gauge Contiplex Echo catheter was placed through the needle and advance out the tip of the Touhy 3-5 cm with the \"Vieira Flip\". The Touhy needle was then removed, and final catheter position verified lateral to the brachial plexus with local anesthetic (LA) and ultrasound visualization. The catheter was secured in the usual fashion with skin glue, benzoin, steri-strips, CHG tegaderm and label noting \"Nerve Block Catheter\". Jerk tape applied at yellow connector and catheter connection. All LA was injected in increments of 3-5 ml after catheter secured. Aspiration every 5 ml to prevent intravascular injection. Injection was completed with negative aspiration of blood and negative intravascular injection. Injection pressures were normal with minimal resistance.           "
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
## 2023-06-13 PROBLEM — G89.18 POSTOPERATIVE PAIN: Status: ACTIVE | Noted: 2023-06-13

## 2023-06-13 LAB
ANION GAP SERPL CALCULATED.3IONS-SCNC: 8 MMOL/L (ref 5–15)
BASOPHILS # BLD AUTO: 0.02 10*3/MM3 (ref 0–0.2)
BASOPHILS NFR BLD AUTO: 0.1 % (ref 0–1.5)
BUN SERPL-MCNC: 15 MG/DL (ref 8–23)
BUN/CREAT SERPL: 29.4 (ref 7–25)
CALCIUM SPEC-SCNC: 8.8 MG/DL (ref 8.6–10.5)
CHLORIDE SERPL-SCNC: 106 MMOL/L (ref 98–107)
CO2 SERPL-SCNC: 26 MMOL/L (ref 22–29)
CREAT SERPL-MCNC: 0.51 MG/DL (ref 0.57–1)
DEPRECATED RDW RBC AUTO: 50 FL (ref 37–54)
EGFRCR SERPLBLD CKD-EPI 2021: 103.1 ML/MIN/1.73
EOSINOPHIL # BLD AUTO: 0 10*3/MM3 (ref 0–0.4)
EOSINOPHIL NFR BLD AUTO: 0 % (ref 0.3–6.2)
ERYTHROCYTE [DISTWIDTH] IN BLOOD BY AUTOMATED COUNT: 14.1 % (ref 12.3–15.4)
GLUCOSE SERPL-MCNC: 110 MG/DL (ref 65–99)
HCT VFR BLD AUTO: 28 % (ref 34–46.6)
HGB BLD-MCNC: 9.1 G/DL (ref 12–15.9)
IMM GRANULOCYTES # BLD AUTO: 0.05 10*3/MM3 (ref 0–0.05)
IMM GRANULOCYTES NFR BLD AUTO: 0.3 % (ref 0–0.5)
LYMPHOCYTES # BLD AUTO: 1.77 10*3/MM3 (ref 0.7–3.1)
LYMPHOCYTES NFR BLD AUTO: 12.1 % (ref 19.6–45.3)
MCH RBC QN AUTO: 31.5 PG (ref 26.6–33)
MCHC RBC AUTO-ENTMCNC: 32.5 G/DL (ref 31.5–35.7)
MCV RBC AUTO: 96.9 FL (ref 79–97)
MONOCYTES # BLD AUTO: 1.28 10*3/MM3 (ref 0.1–0.9)
MONOCYTES NFR BLD AUTO: 8.7 % (ref 5–12)
NEUTROPHILS NFR BLD AUTO: 11.53 10*3/MM3 (ref 1.7–7)
NEUTROPHILS NFR BLD AUTO: 78.8 % (ref 42.7–76)
NRBC BLD AUTO-RTO: 0 /100 WBC (ref 0–0.2)
PLATELET # BLD AUTO: 275 10*3/MM3 (ref 140–450)
PMV BLD AUTO: 9.9 FL (ref 6–12)
POTASSIUM SERPL-SCNC: 4.5 MMOL/L (ref 3.5–5.2)
RBC # BLD AUTO: 2.89 10*6/MM3 (ref 3.77–5.28)
SODIUM SERPL-SCNC: 140 MMOL/L (ref 136–145)
WBC NRBC COR # BLD: 14.65 10*3/MM3 (ref 3.4–10.8)

## 2023-06-13 PROCEDURE — 97535 SELF CARE MNGMENT TRAINING: CPT | Performed by: OCCUPATIONAL THERAPIST

## 2023-06-13 PROCEDURE — 97161 PT EVAL LOW COMPLEX 20 MIN: CPT

## 2023-06-13 PROCEDURE — 97110 THERAPEUTIC EXERCISES: CPT | Performed by: OCCUPATIONAL THERAPIST

## 2023-06-13 PROCEDURE — 97165 OT EVAL LOW COMPLEX 30 MIN: CPT | Performed by: OCCUPATIONAL THERAPIST

## 2023-06-13 PROCEDURE — 63710000001 OXYCODONE 5 MG TABLET: Performed by: ORTHOPAEDIC SURGERY

## 2023-06-13 PROCEDURE — 97116 GAIT TRAINING THERAPY: CPT

## 2023-06-13 PROCEDURE — 25010000002 VANCOMYCIN 10 G RECONSTITUTED SOLUTION: Performed by: ORTHOPAEDIC SURGERY

## 2023-06-13 PROCEDURE — 97530 THERAPEUTIC ACTIVITIES: CPT | Performed by: OCCUPATIONAL THERAPIST

## 2023-06-13 PROCEDURE — 63710000001 SERTRALINE 100 MG TABLET: Performed by: INTERNAL MEDICINE

## 2023-06-13 PROCEDURE — A9270 NON-COVERED ITEM OR SERVICE: HCPCS | Performed by: INTERNAL MEDICINE

## 2023-06-13 PROCEDURE — 80048 BASIC METABOLIC PNL TOTAL CA: CPT | Performed by: ORTHOPAEDIC SURGERY

## 2023-06-13 PROCEDURE — 97530 THERAPEUTIC ACTIVITIES: CPT

## 2023-06-13 PROCEDURE — A9270 NON-COVERED ITEM OR SERVICE: HCPCS | Performed by: ORTHOPAEDIC SURGERY

## 2023-06-13 PROCEDURE — 85025 COMPLETE CBC W/AUTO DIFF WBC: CPT | Performed by: ORTHOPAEDIC SURGERY

## 2023-06-13 PROCEDURE — 63710000001 ACETAMINOPHEN 325 MG TABLET: Performed by: ORTHOPAEDIC SURGERY

## 2023-06-13 RX ORDER — DOCUSATE SODIUM 100 MG/1
100 CAPSULE, LIQUID FILLED ORAL 2 TIMES DAILY
Qty: 30 CAPSULE | Refills: 0 | Status: SHIPPED | OUTPATIENT
Start: 2023-06-13 | End: 2023-06-30

## 2023-06-13 RX ORDER — ACETAMINOPHEN 500 MG
1000 TABLET ORAL EVERY 8 HOURS
Qty: 42 TABLET | Refills: 0
Start: 2023-06-13 | End: 2023-06-20

## 2023-06-13 RX ORDER — OXYCODONE HYDROCHLORIDE 5 MG/1
5 TABLET ORAL EVERY 4 HOURS PRN
Qty: 25 TABLET | Refills: 0 | Status: SHIPPED | OUTPATIENT
Start: 2023-06-13 | End: 2023-06-22

## 2023-06-13 RX ORDER — ROPIVACAINE HYDROCHLORIDE 2 MG/ML
1 INJECTION, SOLUTION EPIDURAL; INFILTRATION; PERINEURAL CONTINUOUS
Start: 2023-06-13

## 2023-06-13 RX ADMIN — ACETAMINOPHEN 650 MG: 325 TABLET ORAL at 09:12

## 2023-06-13 RX ADMIN — SERTRALINE HYDROCHLORIDE 100 MG: 100 TABLET ORAL at 08:27

## 2023-06-13 RX ADMIN — OXYCODONE HYDROCHLORIDE 5 MG: 10 TABLET ORAL at 10:01

## 2023-06-13 RX ADMIN — OXYCODONE HYDROCHLORIDE 5 MG: 10 TABLET ORAL at 00:10

## 2023-06-13 RX ADMIN — OXYCODONE HYDROCHLORIDE 5 MG: 10 TABLET ORAL at 06:23

## 2023-06-13 RX ADMIN — VANCOMYCIN HYDROCHLORIDE 1250 MG: 10 INJECTION, POWDER, LYOPHILIZED, FOR SOLUTION INTRAVENOUS at 00:11

## 2023-06-13 RX ADMIN — OXYCODONE HYDROCHLORIDE 5 MG: 10 TABLET ORAL at 16:03

## 2023-06-13 RX ADMIN — OXYCODONE HYDROCHLORIDE 5 MG: 10 TABLET ORAL at 22:01

## 2023-06-13 NOTE — PROGRESS NOTES
"Orthopedic Daily Progress Note      CC: postop    Pain well controlled  General: no fevers, chills  Abdomen: no nausea, vomiting, or diarrhea    No other complaints    Physical Exam:  I have reviewed the vital signs.  Temp:  [97 °F (36.1 °C)-98 °F (36.7 °C)] 97.8 °F (36.6 °C)  Heart Rate:  [60-89] 74  Resp:  [16-18] 18  BP: ()/(37-79) 112/64    Objective:  Vital signs: (most recent): Blood pressure 112/64, pulse 74, temperature 97.8 °F (36.6 °C), temperature source Oral, resp. rate 18, height 157.5 cm (62.01\"), weight 76.1 kg (167 lb 12.3 oz), SpO2 98 %.            General Appearance:    Alert, cooperative, no distress  Extremities: No clubbing, cyanosis, or edema to lower extremities  Pulses:  2+ in distal surgical extremity  Skin: Dressing Clean/dry/intact      Results Review:    I have reviewed the labs, radiology results and diagnostic studies:    Results from last 7 days   Lab Units 06/13/23  0603   WBC 10*3/mm3 14.65*   HEMOGLOBIN g/dL 9.1*   PLATELETS 10*3/mm3 275     Results from last 7 days   Lab Units 06/13/23  0603   SODIUM mmol/L 140   POTASSIUM mmol/L 4.5   CO2 mmol/L 26.0   CREATININE mg/dL 0.51*   GLUCOSE mg/dL 110*       I have reviewed the medications.    Assessment/Problem List  POD# 1 S/p revision R shoulder arthroplasty (6/12)        Plan  Patient doing well  Plan for discharge home likely today after PT    Discharge Planning: I expect patient to be discharged to home possibly today.    Kaiser James MD  06/13/23  07:48 EDT            "

## 2023-06-13 NOTE — PLAN OF CARE
Goal Outcome Evaluation:  Plan of Care Reviewed With: patient           Outcome Evaluation: Pt declined having OT see her when sister was at bedside as she reports sister will not be available to assist her and pt lives alone. OT educated her on R shoulder precautions, ADL retraining to maintain, sling management and HEP. Pt reports left RC injury and she was unable to complete HEP or sling management. Pt also limited wiith pain. She tolerated R shoulder PROM FE 20, IR chest/ER 20. She was able to demo good teachback with IR/ER, unable to complete FE d/t L shoulder pain. She ambulated 100 feet with CGA and could not tolerate stairs d/t weakness, recommend PT eval. Pt reports sister cannot assist her at home and d/t inability to manage sling and HEP, recommend IPR. Pt is in agreement.  and RN aware of OT concerns about DC home.
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
## 2023-06-13 NOTE — PROGRESS NOTES
"IM progress note      Sara Lorenz  [id number]  1955     LOS: 1 day     Attending: Maverick Bernal MD    Primary Care Provider: Breanne Benites DO      Chief Complaint/Reason for visit:  No chief complaint on file.      Subjective    Dealing with increased pain when seen earlier. Acute pain service following. Received pain meds.   Not cleared by OT for home dc later.     Objective      Visit Vitals  /54 (BP Location: Left arm, Patient Position: Lying)   Pulse 92   Temp 98.3 °F (36.8 °C) (Oral)   Resp 16   Ht 157.5 cm (62.01\")   Wt 76.1 kg (167 lb 12.3 oz)   SpO2 96%   BMI 30.68 kg/m²     Temp (24hrs), Av.5 °F (36.4 °C), Min:97 °F (36.1 °C), Max:98.3 °F (36.8 °C)      Intake/Output:    Intake/Output Summary (Last 24 hours) at 2023 1401  Last data filed at 2023 1200  Gross per 24 hour   Intake 1900 ml   Output 1750 ml   Net 150 ml          Physical Exam:     General Appearance:    Alert, cooperative, in no acute distress   Head:    Normocephalic, without obvious abnormality, atraumatic    Lungs:     Normal effort, symmetric chest rise,  clear to  auscultation bilaterally                 Heart:    Regular rhythm and normal rate, normal S1 and S2   Abdomen:     Soft, ND, NT.   Extremities:    RUE in a sling/ CDI dressing. PNB cath,infu block. Moves hand and wrist well.   Pulses:   Pulses palpable and equal bilaterally   Skin:   No bleeding, bruising or rash          Results Review:     I reviewed the patient's new clinical results.   Results from last 7 days   Lab Units 23  0603   WBC 10*3/mm3 14.65*   HEMOGLOBIN g/dL 9.1*   HEMATOCRIT % 28.0*   PLATELETS 10*3/mm3 275     Results from last 7 days   Lab Units 23  0603 23  1021   SODIUM mmol/L 140  --    POTASSIUM mmol/L 4.5 4.0   CHLORIDE mmol/L 106  --    CO2 mmol/L 26.0  --    BUN mg/dL 15  --    CREATININE mg/dL 0.51*  --    CALCIUM mg/dL 8.8  --    GLUCOSE mg/dL 110*  --      I reviewed the patient's new imaging " including images and reports.    All medications reviewed.   bisoprolol, 5 mg, Oral, Daily  droperidol, 0.625 mg, Intravenous, Once  sertraline, 100 mg, Oral, Daily          Assessment & Plan       S/P reverse total shoulder arthroplasty, right ( revision from hemiarthroplasty)    Generalized anxiety disorder    Essential hypertension    Mood disorder    Pacemaker    Shoulder pain    Postoperative pain     Plan   1. PT/OT. NWB, RUE, ROM hand, wrist, elbow.  2. Pain control-prns, interscalene nerve block cath with ropivacaine infusion.   3. IS-encourage  4. DVT proph- Mech/ mobilize.  5. Bowel regimen  6. Resume home medications as appropriate  7. Monitor post-op labs  8. DC planning , pending further progress with rehab.     - Hypertension:  Resume home medications as appropriate, formulary substitution when indicated.  Holding parameters.  Prn medications for elevated blood pressure.     -Anxiety:  Resumed home regimen    Geraldo disclaimer:  Part of this encounter note is an electronic transcription/translation of spoken language to printed text. The electronic translation of spoken language may permit erroneous, or at times, nonsensical words or phrases to be inadvertently transcribed; Although I have reviewed the note for such errors, some may still exist.    Maverick Bernal MD  06/13/23  14:01 EDT

## 2023-06-13 NOTE — PROGRESS NOTES
[facility]    Acute pain service Inpatient Progress Note    Patient Name: Sara Lorenz  :  1955  MRN:  [id number]        Acute Pain  Service Inpatient Progress Note:    Analgesia:Poor  Pain Score:7/10  LOC: alert and awake  Resp Status: room air  Cardiac: VS stable  Side Effects:None  Catheter Site:clean, dressing intact and dry  Cath type: peripheral nerve cath with ON Q  Volume: 1mL,5ml, 5ml InfuSystem Pump.  Dosing/Volume: ropivacaine 0.2%  Catheter Plan:Catheter to remain Insitu, Continue catheter infusion rate unchanged and Bolus Catheter  Comments: The neuro assessment of the operative extremity includes the ability to do finger flexion and extension; includes the ability to do wrist flexion and extension, includes the ability to do elbow flexion and extension.  The neuro exam of the patient includes sensory function throughout the operative extremity.           Bolused cath with 5 ml 0.5% ropivicaine earlier today. At that time she reported pain as 9. Now reports 7.

## 2023-06-13 NOTE — PLAN OF CARE
Goal Outcome Evaluation:           Progress: improving  Outcome Evaluation: VSS. 3L NC. PRNs administerd for complaints of pain and nausea. Heating pad applied to back and shoulders. Lidocaine ointment applied. Tube feeding infusing at 35ml/hr. Residual 5ml this morning and 8ml at noon. Skincare complete. Wound dressing changed. Skin precautions in place. Hep drip infusing. Nuñez in place. IS and flutter valve provided and reviewed with patient. SLP consult placed. Planned for modified barium tomorrow. Cards and ID consulted.
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
## 2023-06-13 NOTE — PLAN OF CARE
Goal Outcome Evaluation:  Plan of Care Reviewed With: patient           Outcome Evaluation: PT eval completed. Patient alert and oriented x4. Presenting with deficits in BLE strength, dynamic balance, and functional endurance effecting functional mobility below baseline. Patient will benefit from skilled IP PT services to address impairments for return to PLOF. Recommend home with 24 hour assist at dc.

## 2023-06-13 NOTE — PATIENT CARE CONFERENCE
Received call from EVERTON Weiner, that she spoke with patient's sister who reports she can assist pt at home. Sister planning to arrive on 6/14 for training on sling and HEP between 11-2. OT to f/u.   Patrizia CRYSTAL/ZAIRA

## 2023-06-13 NOTE — PLAN OF CARE
Goal Outcome Evaluation:  Plan of Care Reviewed With: patient           Outcome Evaluation: AOx4. Non-tele. RA-2L NC. Using IS independently. Able to tolerate ambulation in room. Able to voind independently without difficulty. No complaints of N/V. PRN meds given for pain and Ropivocaine pump in use. Relief of pain upon assessment. Sling in place. No other concerns. Will monitor closely and provide care as appropriate per provider orders.

## 2023-06-13 NOTE — CASE MANAGEMENT/SOCIAL WORK
Continued Stay Note   Mike     Patient Name: Sara Lorenz  MRN: [id number]  Today's Date: 6/13/2023    Admit Date: 6/12/2023    Plan: home   Discharge Plan       Row Name 06/13/23 1252       Plan    Plan home    Patient/Family in Agreement with Plan yes    Plan Comments Ms. Lorenz resides in [facility] alone.  She is independent with ADL's and does not use any DME.  She is post op reverse total shoulder arthroplasty, right.  I spoke with sister who is enroute to hospital and plans to assist patient as much as possible for the first week.  Patient continues to work with OT and sling.  CM will continue to follow for discharge planning.    Final Discharge Disposition Code 01 - home or self-care                   Discharge Codes    No documentation.                 Expected Discharge Date and Time       Expected Discharge Date Expected Discharge Time    Jun 13, 2023               Regine Mosqueda RN

## 2023-06-13 NOTE — CASE MANAGEMENT/SOCIAL WORK
Continued Stay Note  [facility]     Patient Name: Sara Lorenz  MRN: [id number]  Today's Date: 6/13/2023    Admit Date: 6/12/2023    Plan: Home vs rehab   Discharge Plan       Row Name 06/13/23 8997       Plan    Plan Home vs rehab    Patient/Family in Agreement with Plan yes    Plan Comments I spoke with Ms. Lorenz in room regarding discharge planning.  She does not feel she can take care of her sling and exercises at home.  She has worked with OT and is now going to work with PT.  OT has recommended IPR at this time.  Patient has requested a referral to [facility].  Referral called to Corine at [facility].    Final Discharge Disposition Code 03 - skilled nursing facility (SNF)      Row Name 06/13/23 1252       Plan    Plan home    Patient/Family in Agreement with Plan yes    Plan Comments Ms. Lorenz resides in [facility] alone.  She is independent with ADL's and does not use any DME.  She is post op reverse total shoulder arthroplasty, right.  I spoke with sister who is enroute to hospital and plans to assist patient as much as possible for the first week.  Patient continues to work with OT and sling.  CM will continue to follow for discharge planning.    Final Discharge Disposition Code 01 - home or self-care                   Discharge Codes    No documentation.                 Expected Discharge Date and Time       Expected Discharge Date Expected Discharge Time    Jun 13, 2023               Regine Mosqueda RN

## 2023-06-14 PROCEDURE — 97116 GAIT TRAINING THERAPY: CPT

## 2023-06-14 PROCEDURE — 97110 THERAPEUTIC EXERCISES: CPT | Performed by: OCCUPATIONAL THERAPIST

## 2023-06-14 PROCEDURE — 25010000002 HYDROMORPHONE PER 4 MG: Performed by: ORTHOPAEDIC SURGERY

## 2023-06-14 PROCEDURE — 97535 SELF CARE MNGMENT TRAINING: CPT | Performed by: OCCUPATIONAL THERAPIST

## 2023-06-14 RX ADMIN — SERTRALINE HYDROCHLORIDE 100 MG: 100 TABLET ORAL at 08:34

## 2023-06-14 RX ADMIN — SODIUM CHLORIDE 50 ML/HR: 4.5 INJECTION, SOLUTION INTRAVENOUS at 08:40

## 2023-06-14 RX ADMIN — OXYCODONE HYDROCHLORIDE 5 MG: 10 TABLET ORAL at 11:00

## 2023-06-14 RX ADMIN — OXYCODONE HYDROCHLORIDE 5 MG: 10 TABLET ORAL at 15:03

## 2023-06-14 RX ADMIN — HYDROMORPHONE HYDROCHLORIDE 0.5 MG: 1 INJECTION, SOLUTION INTRAMUSCULAR; INTRAVENOUS; SUBCUTANEOUS at 06:00

## 2023-06-14 RX ADMIN — OXYCODONE HYDROCHLORIDE 5 MG: 10 TABLET ORAL at 19:48

## 2023-06-14 RX ADMIN — OXYCODONE HYDROCHLORIDE 5 MG: 10 TABLET ORAL at 02:56

## 2023-06-14 NOTE — PROGRESS NOTES
"Orthopedic Daily Progress Note      CC: C/o more pain last night    Pain somewhat controlled  General: no fevers, chills  Abdomen: no nausea, vomiting, or diarrhea    No other complaints    Physical Exam:  I have reviewed the vital signs.  Temp:  [98.1 °F (36.7 °C)-98.4 °F (36.9 °C)] 98.1 °F (36.7 °C)  Heart Rate:  [73-92] 87  Resp:  [14-18] 18  BP: (110-129)/(48-73) 110/48    Objective:  Vital signs: (most recent): Blood pressure 110/48, pulse 87, temperature 98.1 °F (36.7 °C), temperature source Oral, resp. rate 18, height 157.5 cm (62.01\"), weight 76.1 kg (167 lb 12.3 oz), SpO2 91 %.            General Appearance:    Alert, cooperative, no distress  Extremities: No clubbing, cyanosis, or edema to lower extremities  Pulses:  2+ in distal surgical extremity  Skin: Dressing Clean/dry/intact      Results Review:    I have reviewed the labs, radiology results and diagnostic studies:    Results from last 7 days   Lab Units 06/13/23  0603   WBC 10*3/mm3 14.65*   HEMOGLOBIN g/dL 9.1*   PLATELETS 10*3/mm3 275     Results from last 7 days   Lab Units 06/13/23  0603   SODIUM mmol/L 140   POTASSIUM mmol/L 4.5   CO2 mmol/L 26.0   CREATININE mg/dL 0.51*   GLUCOSE mg/dL 110*       I have reviewed the medications.    Assessment/Problem List  POD# 2 S/p Right revision hemiarthroplasty to reverse TSA    Plan  Pain control  Patient prefers to go home with home health rather than inpatient rehab/snf. Ok from my standpoint.  D/c home w/HH when pain controlled and medically ready.    Caleb Harmon MD  06/14/23  09:26 EDT            "

## 2023-06-14 NOTE — PLAN OF CARE
Goal Outcome Evaluation:  Plan of Care Reviewed With: patient   VSS, RA, A&Ox4. PRNs given for pain. Pt slept periodically throughout the night. No further concerns at this time.

## 2023-06-14 NOTE — PROGRESS NOTES
"IM progress note      Sara Lorenz  [id number]  1955     LOS: 2 days     Attending: Maverick Bernal MD    Primary Care Provider: Breanne Benites DO      Chief Complaint/Reason for visit:  No chief complaint on file.      Subjective   Doing well. Good pain control. Cleared by OT. Apparently, discharge is cancelled today due to insurance/home health arrangements that require the patient to stay until AM. Denies f/c/n/v/sob/cp.    Objective      Visit Vitals  /73 (BP Location: Left arm, Patient Position: Lying)   Pulse 84   Temp 98.3 °F (36.8 °C) (Oral)   Resp 18   Ht 157.5 cm (62.01\")   Wt 76.1 kg (167 lb 12.3 oz)   SpO2 95%   BMI 30.68 kg/m²     Temp (24hrs), Av.3 °F (36.8 °C), Min:98.1 °F (36.7 °C), Max:98.4 °F (36.9 °C)      Intake/Output:    Intake/Output Summary (Last 24 hours) at 2023 1730  Last data filed at 2023 1300  Gross per 24 hour   Intake 100 ml   Output 3300 ml   Net -3200 ml       Occupational therapy: OT awaited for arrival of sister as scheduled between -, however she did not come in for teaching. OT reviewed R shoulder precautions, ADL retraining to maintain, sling management and HEP. She was able to don/doff sling with CGA with increased time and effort. She was able to perform SROM and demo R shoulder PROM FE 40, IR chest/ER 10 all limited with pain R shoulder and no c/o L shoulder pain today. She has been inconsistent with reporting help available from sister, however at end of session she confirmed sister can stay with her and assist. She also discussed at length need for ice pack that plugs into wall, educated her on use of cold packs which she declines to use d/t weight. Recommend DC home with initial 24/7 assist from sister and HHOT.     Physical Exam:     General Appearance:    Alert, cooperative, in no acute distress   Head:    Normocephalic, without obvious abnormality, atraumatic    Lungs:     Normal effort, symmetric chest rise,  clear to  " auscultation bilaterally                 Heart:    Regular rhythm and normal rate, normal S1 and S2   Abdomen:     Soft, ND, NT.   Extremities:    RUE in a sling/ CDI dressing. PNB cath,infu block. Moves hand and wrist well.   Pulses:   Pulses palpable and equal bilaterally   Skin:   No bleeding, bruising or rash          Results Review:     I reviewed the patient's new clinical results.   Results from last 7 days   Lab Units 06/13/23  0603   WBC 10*3/mm3 14.65*   HEMOGLOBIN g/dL 9.1*   HEMATOCRIT % 28.0*   PLATELETS 10*3/mm3 275       Results from last 7 days   Lab Units 06/13/23  0603 06/12/23  1021   SODIUM mmol/L 140  --    POTASSIUM mmol/L 4.5 4.0   CHLORIDE mmol/L 106  --    CO2 mmol/L 26.0  --    BUN mg/dL 15  --    CREATININE mg/dL 0.51*  --    CALCIUM mg/dL 8.8  --    GLUCOSE mg/dL 110*  --        I reviewed the patient's new imaging including images and reports.    All medications reviewed.   bisoprolol, 5 mg, Oral, Daily  droperidol, 0.625 mg, Intravenous, Once  sertraline, 100 mg, Oral, Daily          Assessment & Plan       S/P reverse total shoulder arthroplasty, right ( revision from hemiarthroplasty)    Generalized anxiety disorder    Essential hypertension    Mood disorder    Pacemaker    Shoulder pain    Postoperative pain     Plan   1. PT/OT. NWB, RUE, ROM hand, wrist, elbow.  2. Pain control-prns, interscalene nerve block cath with ropivacaine infusion.   3. IS-encourage  4. DVT proph- Mech/ mobilize.  5. Bowel regimen  6. Resume home medications as appropriate  7. Monitor post-op labs  8. DC planning for home in AM when HH arrangements/insurance issue is resolved    - Hypertension:  Resume home medications as appropriate, formulary substitution when indicated.  Holding parameters.  Prn medications for elevated blood pressure.     -Anxiety:  Resumed home regimen    Geraldo disclaimer:  Part of this encounter note is an electronic transcription/translation of spoken language to printed text. The  electronic translation of spoken language may permit erroneous, or at times, nonsensical words or phrases to be inadvertently transcribed; Although I have reviewed the note for such errors, some may still exist.    IRASEMA Hwang  06/14/23  17:30 EDT
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
## 2023-06-14 NOTE — PLAN OF CARE
Goal Outcome Evaluation:  Plan of Care Reviewed With: patient        Progress: improving  Outcome Evaluation: Pt demonstrated good balance and safety awareness with all functional mobility. Pt performed bed mobility Mod I, amb in hallway with SBA, and navigated two steps with CGA. No LOB noted. Activity limited by fatigue. Pt reported sister is able to stay with pt initially. Recommend d/c home with assist when medically appropriate.

## 2023-06-14 NOTE — PROGRESS NOTES
[facility]    Acute pain service Inpatient Progress Note    Patient Name: Sara Lorenz  :  1955  MRN:  [id number]        Acute Pain  Service Inpatient Progress Note:    Analgesia:Good  Pain Score:6/10  LOC: alert and awake  Resp Status: room air  Cardiac: VS stable  Side Effects:None  Catheter Site:clean, dressing intact and dry  Cath type: peripheral nerve cath with ON Q  Volume: 1mL,5ml, 5ml InfuSystem Pump.  Catheter Plan:Catheter to remain Insitu and Continue catheter infusion rate unchanged  Comments: The neuro assessment of the operative extremity includes the ability to do finger flexion and extension; includes the ability to do wrist flexion and extension, includes the ability to do elbow flexion and extension.  The neuro exam of the patient includes sensory function throughout the operative extremity.    Clinician bolus given

## 2023-06-14 NOTE — CASE MANAGEMENT/SOCIAL WORK
Case Management Discharge Note      Final Note: [facility] has accepted patient and will follow up with her at home for PT and OT.    No other needs identified.  Plan is home with home health.            Home Medical Care Coordination complete.      Service Provider Selected Services Address Phone Fax Patient Preferred    [facility] [address], [address] [id number] [phone] --                           Final Discharge Disposition Code: 06 - home with home health care

## 2023-06-14 NOTE — PLAN OF CARE
Goal Outcome Evaluation:  Plan of Care Reviewed With: patient        Progress: improving  Outcome Evaluation: VSS. RA. Alert and oriented x4. Non-tele. Pt complaints of pain. PRN's given for pain control. No complaints of nausea. Fluids infusing. Pt worked with therapy. Pt to be discharged tomorrow morning-was not discharged today due to home health needing to see pt within 48 hours to be accepted per case management. Pt resting comfortably. No further complaints at this time.
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
## 2023-06-14 NOTE — CASE MANAGEMENT/SOCIAL WORK
Continued Stay Note  [facility]     Patient Name: Sara Lorenz  MRN: [id number]  Today's Date: 6/14/2023    Admit Date: 6/12/2023    Plan: Home with home health VS rehab.   Discharge Plan       Row Name 06/14/23 1450       Plan    Plan Home with home health VS rehab.    Patient/Family in Agreement with Plan yes    Plan Comments I spoke with patient in room and she is going to decide if she needs rehab after working with OT today,  Otherwise she wants to go home with home health.  I verified with DR. Harmon that she could have both PT and OT home health.    Final Discharge Disposition Code 30 - still a patient                   Discharge Codes    No documentation.                 Expected Discharge Date and Time       Expected Discharge Date Expected Discharge Time    Jun 13, 2023               Regine Mosqueda RN

## 2023-06-14 NOTE — PLAN OF CARE
Goal Outcome Evaluation:  Plan of Care Reviewed With: patient        Progress: improving  Outcome Evaluation: OT awaited for arrival of sister as scheduled between 11-2, however she did not come in for teaching. OT reviewed R shoulder precautions, ADL retraining to maintain, sling management and HEP. She was able to don/doff sling with CGA with increased time and effort. She was able to perform SROM and demo R shoulder PROM FE 40, IR chest/ER 10 all limited with pain R shoulder and no c/o L shoulder pain today. She has been inconsistent with reporting help available from sister, however at end of session she confirmed sister can stay with her and assist. She also discussed at length need for ice pack that plugs into wall, educated her on use of cold packs which she declines to use d/t weight. Recommend DC home with initial 24/7 assist from sister and HHOT.      Evaluation Complexity (OT)  Review Occupational Profile/Medical/Therapy History Complexity: brief/low complexity  Assessment, Occupational Performance/Identification of Deficit Complexity: 1-3 performance deficits  Clinical Decision Making Complexity (OT): problem focused assessment/low complexity  Overall Complexity of Evaluation (OT): low complexity

## 2023-06-15 ENCOUNTER — READMISSION MANAGEMENT (OUTPATIENT)
Dept: CALL CENTER | Facility: HOSPITAL | Age: 68
End: 2023-06-15
Payer: MEDICARE

## 2023-06-15 VITALS
RESPIRATION RATE: 18 BRPM | BODY MASS INDEX: 30.87 KG/M2 | HEART RATE: 80 BPM | WEIGHT: 167.77 LBS | DIASTOLIC BLOOD PRESSURE: 83 MMHG | TEMPERATURE: 98 F | HEIGHT: 62 IN | OXYGEN SATURATION: 92 % | SYSTOLIC BLOOD PRESSURE: 124 MMHG

## 2023-06-15 PROCEDURE — 97110 THERAPEUTIC EXERCISES: CPT

## 2023-06-15 PROCEDURE — 97535 SELF CARE MNGMENT TRAINING: CPT

## 2023-06-15 PROCEDURE — 97530 THERAPEUTIC ACTIVITIES: CPT

## 2023-06-15 RX ADMIN — OXYCODONE HYDROCHLORIDE 5 MG: 10 TABLET ORAL at 01:16

## 2023-06-15 RX ADMIN — OXYCODONE HYDROCHLORIDE 5 MG: 10 TABLET ORAL at 06:05

## 2023-06-15 RX ADMIN — ACETAMINOPHEN 650 MG: 325 TABLET ORAL at 10:03

## 2023-06-15 RX ADMIN — OXYCODONE HYDROCHLORIDE 5 MG: 10 TABLET ORAL at 14:58

## 2023-06-15 RX ADMIN — SERTRALINE HYDROCHLORIDE 100 MG: 100 TABLET ORAL at 09:31

## 2023-06-15 RX ADMIN — OXYCODONE HYDROCHLORIDE 5 MG: 10 TABLET ORAL at 10:03

## 2023-06-15 RX ADMIN — BISOPROLOL FUMARATE 5 MG: 5 TABLET, FILM COATED ORAL at 09:31

## 2023-06-15 NOTE — PROGRESS NOTES
[facility]    Acute pain service Inpatient Progress Note    Patient Name: Sara Lorenz  :  1955  MRN:  [id number]        Acute Pain  Service Inpatient Progress Note:    Analgesia:Good  Pain Score:3/10  LOC: alert and awake  Resp Status: room air  Cardiac: VS stable  Side Effects:None  Catheter Site:clean, dressing intact and dry  Cath type: peripheral nerve cath with ON Q  Infusion rate: Ext/Pop: Basal: 1ml/hr, PIB: 5ml q 2 h, PCA: 5 ml q 30 min (1mL,5ml, 5ml InfuSystem Pump)  Dosing/Volume: ropivacaine 0.2%  Catheter Plan:Catheter to remain Insitu and Continue catheter infusion rate unchanged  Comments: The neuro assessment of the operative extremity includes the ability to do finger flexion and extension; includes the ability to do wrist flexion and extension, includes the ability to do elbow flexion and extension.  The neuro exam of the patient includes    sensory function throughout the operative extremity.

## 2023-06-15 NOTE — DISCHARGE SUMMARY
Patient Name: Sara Lorenz  MRN: [id number]  : 1955  DOS: 6/15/2023    Attending: Maverick Bernal MD    Primary Care Provider: Breanne Benites DO    Date of Admission:.2023  8:57 AM    Date of Discharge:  6/15/2023    Discharge Diagnosis:     S/P reverse total shoulder arthroplasty, right ( revision from hemiarthroplasty)    Generalized anxiety disorder    Essential hypertension    Mood disorder    Pacemaker    Shoulder pain    Postoperative pain      Hospital Course    At admit:  Patient is a pleasant 67 y.o. female presented for scheduled surgery by      She underwent right reverse total shoulder arthroplasty, revision from hemiarthroplasty, surgery was done under GA and a block, she tolerated surgery well, and was admitted for further management.     Seen in PACU postop, doing fairly well, good pain control, feels some pressure on the inside of her arm.  No nausea, vomiting, or shortness of breath.    After admit:  Patient was provided pain medications as needed for pain control, along with interscalene nerve block infusion of Ropivacaine    Adjustments were made to pain medications to optimize postop pain management.   Risks and benefits of opiate medications discussed with patient. ZINA report on chart was reviewed.    The patient was seen by OT and was taught exercises for right arm.  The patient used an IS for atelectasis prophylaxis and mechanicals for DVT prophylaxis.    Home medications were resumed as appropriate, and labs were monitored and remained fairly stable.     With the progress she has made, Ms. Lorenz is ready for DC home today.      The patient will have an Infupump ( instructed on it during this admit)  Discussed with patient regarding plan and She shows understanding and agreement.        Procedures Performed  Procedure(s):  REVISION RIGHT SHOULDER HEMIARTHROPLASTY TO REVERSE TOTAL SHOULDER ARTHROPLASTY, REVISION OF BOTH COMPONENTS       Pertinent Test  "Results:    I reviewed the patient's new clinical results.   Results from last 7 days   Lab Units 23  0603   WBC 10*3/mm3 14.65*   HEMOGLOBIN g/dL 9.1*   HEMATOCRIT % 28.0*   PLATELETS 10*3/mm3 275     Results from last 7 days   Lab Units 23  0603 23  1021   SODIUM mmol/L 140  --    POTASSIUM mmol/L 4.5 4.0   CHLORIDE mmol/L 106  --    CO2 mmol/L 26.0  --    BUN mg/dL 15  --    CREATININE mg/dL 0.51*  --    CALCIUM mg/dL 8.8  --    GLUCOSE mg/dL 110*  --      I reviewed the patient's new imaging including images and reports.      Occupational Therapy    Goal Outcome Evaluation:  Plan of Care Reviewed With: patient  Progress: improving  Outcome Evaluation: Pt continues to present below baseline with decreased strength, balance, coordination, and overall independence with ADLS. OT reviewed all shoulder precautions, axilla care, marge dressing and care of nerve catheter during ADLS. Pt dof/don sling with supervision and completed axilla care with setup. Reports sister to assist her at home however sister not at bedside. Transfers and ambulates with CGA. Pt completed PROM FE to 90, IR to chest, ER to 30 and AAROM/AROM x10 reps elbow/wrist/hand/scapula with good teach back. Recommend d/c home with assist from sister.            PT:      Goal Outcome Evaluation:  Plan of Care Reviewed With: patient  Progress: improving  Outcome Evaluation: patient was able to ambulate 300 ft with supervision using straight cane no LOB with activity patient has met mobility goals and is supposed to D/C home today with home health services.             Discharge Assessment:       Visit Vitals  /83 (BP Location: Left arm, Patient Position: Lying)   Pulse 78   Temp 98 °F (36.7 °C) (Oral)   Resp 18   Ht 157.5 cm (62.01\")   Wt 76.1 kg (167 lb 12.3 oz)   SpO2 97%   BMI 30.68 kg/m²     Temp (24hrs), Av.3 °F (36.8 °C), Min:97.9 °F (36.6 °C), Max:98.6 °F (37 °C)      General Appearance:    Alert, cooperative, in no acute " distress   Lungs:     Clear to auscultation,respirations regular, even and   unlabored    Heart:    Regular rhythm and normal rate, normal S1 and S2    Abdomen:     Normal bowel sounds, no masses, no organomegaly, soft        non-tender, non-distended, no guarding, no rebound                 tenderness   Extremities:   RUE in a sling, CDI  dressing over right shoulder incision . Interscalene nerve block cath present.  Distal pulses, cap refill,  intact. Movement and sensation intact distally.     Pulses:   Pulses palpable and equal bilaterally   Skin:   No bleeding, bruising or rash        Discharge Disposition:home.           Discharge Medications        New Medications        Instructions Start Date   acetaminophen 500 MG tablet  Commonly known as: TYLENOL   1,000 mg, Oral, Every 8 Hours, Take every 8 hours  as needed after 1 week      docusate sodium 100 MG capsule  Commonly known as: COLACE   100 mg, Oral, 2 Times Daily      oxyCODONE 5 MG immediate release tablet  Commonly known as: ROXICODONE   5 mg, Oral, Every 4 Hours PRN      ropivacaine 0.2 % infusion (INFUSYSTEM)  Commonly known as: NAROPIN   1 mL/hr (2 mg/hr), Peripheral Nerve, Continuous             Continue These Medications        Instructions Start Date   bisoprolol 5 MG tablet  Commonly known as: ZEBeta   5 mg, Oral, Daily      dextroamphetamine 10 MG 24 hr capsule  Commonly known as: DEXEDRINE SPANSULE   20 mg, Oral, Every Morning      dextroamphetamine 10 MG tablet  Commonly known as: DEXTROSTAT   20 mg, Oral, Every Afternoon      sertraline 100 MG tablet  Commonly known as: ZOLOFT   100 mg, Oral, Daily      spironolactone 25 MG tablet  Commonly known as: ALDACTONE   Take 1 tablet by mouth Daily.               Discharge Diet: resume prior.    Activity at Discharge:   NWB RUE, ROM elbow, wrist, and hand per 's instructions.    Follow-up Appointments:   Caleb Harmon MD per his orders.          Maverick Bernal MD  06/15/23  12:20  EDT

## 2023-06-15 NOTE — PLAN OF CARE
Goal Outcome Evaluation:  Plan of Care Reviewed With: patient   VSS, RA, A&Ox4. PRNs given for pain. IV fluids infusing.

## 2023-06-15 NOTE — PLAN OF CARE
Goal Outcome Evaluation:  Plan of Care Reviewed With: patient        Progress: improving  Outcome Evaluation: Pt continues to present below baseline with decreased strength, balance, coordination, and overall independence with ADLS. OT reviewed all shoulder precautions, axilla care, marge dressing and care of nerve catheter during ADLS. Pt dof/don sling with supervision and completed axilla care with setup. Reports sister to assist her at home however sister not at bedside. Transfers and ambulates with CGA. Pt completed PROM FE to 90, IR to chest, ER to 30 and AAROM/AROM x10 reps elbow/wrist/hand/scapula with good teach back. Recommend d/c home with assist from sister.

## 2023-06-15 NOTE — OUTREACH NOTE
Prep Survey      Flowsheet Row Responses   [facility] facility patient discharged from? [facility]   Is LACE score < 7 ? No   Eligibility [facility]   Date of Admission 06/12/23   Date of Discharge 06/15/23   Discharge Disposition Home or Self Care   Discharge diagnosis s/p reverse total shoulder arthro   Does the patient have one of the following disease processes/diagnoses(primary or secondary)? Total Joint Replacement   Does the patient have Home health ordered? Yes   What is the Home health agency?  [facility]   Is there a DME ordered? No   Prep survey completed? Yes            CAREY LOPEZ - Registered Nurse
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
## 2023-06-15 NOTE — PLAN OF CARE
Goal Outcome Evaluation:  Plan of Care Reviewed With: patient        Progress: improving  Outcome Evaluation: patient was able to ambulate 300 ft with supervision using straight cane no LOB with activity patient has met mobility goals and is supposed to D/C home today with home health services.

## 2023-06-15 NOTE — PROGRESS NOTES
"Orthopedic Daily Progress Note      CC: Better    Pain controlled  General: no fevers, chills  Abdomen: no nausea, vomiting, or diarrhea    No other complaints    Physical Exam:  I have reviewed the vital signs.  Temp:  [97.9 °F (36.6 °C)-98.6 °F (37 °C)] 98 °F (36.7 °C)  Heart Rate:  [] 80  Resp:  [18] 18  BP: (123-142)/(59-83) 124/83    Objective:  Vital signs: (most recent): Blood pressure 124/83, pulse 80, temperature 98 °F (36.7 °C), temperature source Oral, resp. rate 18, height 157.5 cm (62.01\"), weight 76.1 kg (167 lb 12.3 oz), SpO2 92 %.            General Appearance:    Alert, cooperative, no distress  Extremities: No clubbing, cyanosis, or edema to lower extremities  Pulses:  2+ in distal surgical extremity  Skin: Dressing Clean/dry/intact      Results Review:    I have reviewed the labs, radiology results and diagnostic studies:    Results from last 7 days   Lab Units 06/13/23  0603   WBC 10*3/mm3 14.65*   HEMOGLOBIN g/dL 9.1*   PLATELETS 10*3/mm3 275     Results from last 7 days   Lab Units 06/13/23  0603   SODIUM mmol/L 140   POTASSIUM mmol/L 4.5   CO2 mmol/L 26.0   CREATININE mg/dL 0.51*   GLUCOSE mg/dL 110*       I have reviewed the medications.    Assessment/Problem List  POD# 3 S/p Revision right marge to reverse TSA    Plan  D/c to home with ROBYN Harmon MD  06/15/23  12:41 EDT            "

## 2023-06-16 ENCOUNTER — TRANSITIONAL CARE MANAGEMENT TELEPHONE ENCOUNTER (OUTPATIENT)
Dept: CALL CENTER | Facility: HOSPITAL | Age: 68
End: 2023-06-16
Payer: MEDICARE

## 2023-06-16 NOTE — OUTREACH NOTE
Call Center TCM Note      Flowsheet Row Responses   [facility], [place], operated by [facility] patient discharged from? [place]   Does the patient have one of the following disease processes/diagnoses(primary or secondary)? Total Joint Replacement   Joint surgery performed? Shoulder   TCM attempt successful? Yes   Call start time 1135   Call end time 1141   Has the patient been back in either the hospital or Emergency Department since discharge? No   Discharge diagnosis s/p reverse total shoulder arthro   Person spoke with today (if not patient) and relationship Patient   Does the patient have all medications related to this admission filled (includes all antibiotics, pain medications, etc.) Yes   Is the patient taking all medications as directed (includes completed medication regime)? Yes   Is the patient able to teach back alternate methods of pain control? Shoulder-elevate above heart/ keep in sling as advised, Ice   Comments PCP Dr Benites. Patient declines to schedule PCP f/u appt at this time.   Does the patient have an appointment with their PCP within 7 days of discharge? No   Nursing Interventions Patient desires to follow up with specialty only   What is the Home health agency?  [facility]  for [facility] and OT   Has home health visited the patient within 72 hours of discharge? Call prior to 72 hours  [Patient reports [facility] scheduled to come tomorrow]   Psychosocial issues? No   When is the first therapy visit scheduled (PO Day) including how many days per week  6/17 POD #5   Has the patient began therapy sessions (either in the home or as an out patient)? No   Does the patient have a wound vac in place? No   Has the patient fallen since discharge? No   Did the patient receive a copy of their discharge instructions? Yes   Nursing interventions Reviewed instructions with patient   What is the patient's perception of their functional status since discharge? Improving   Is the patient able to teach back how to prevent infection? Shower  only as directed by surgeon  [When nerve catheter comes out, may begin showeing and washing with bandage on. Bandage will be removed at first office visit]   Is the patient able to teach back home safety measures? Modifications to reach items   If the patient is a current smoker, are they able to teach back resources for cessation? Not a smoker   Is the patient/caregiver able to teach back the hierarchy of who to call/visit for symptoms/problems? PCP, Specialist, Home health nurse, Urgent Care, ED, 911 Yes   TCM call completed? Yes   Wrap up additional comments Appt with Dr Harmon 6/20  1040am   Call end time 1141   Would this patient benefit from a Referral to [facility]? No   Is the patient interested in additional calls from an ambulatory ?  NOTE:  applies to high risk patients requiring additional follow-up. No            Park Kowalski RN    6/16/2023, 11:45 EDT

## 2023-06-17 ENCOUNTER — NURSE TRIAGE (OUTPATIENT)
Dept: CALL CENTER | Facility: HOSPITAL | Age: 68
End: 2023-06-17
Payer: MEDICARE

## 2023-06-17 LAB
BACTERIA SPEC AEROBE CULT: NORMAL
BACTERIA SPEC ANAEROBE CULT: NORMAL
GRAM STN SPEC: NORMAL

## 2023-06-18 NOTE — TELEPHONE ENCOUNTER
Caller states some redness in right axilla area from where she is keeping arm down from her Shoulder surgery. Caller states with her arm flap not getting much air there. Caller has washed area and will keep dry. Caller will attempt to get some air in the area and will monitor. Caller advised should not improve with care advice call back.     Reason for Disposition   Mild localized rash    Additional Information   Negative: [1] Sudden onset of rash (within last 2 hours) AND [2] difficulty breathing or swallowing   Negative: Sounds like a life-threatening emergency to the triager   Negative: Athlete's Foot suspected (i.e., itchy rash between the toes)   Negative: Impetigo suspected (i.e., painless infected superficial small sores, less than 1 inch or 2.5 cm, often covered by a soft, yellow-brown scab or crust; sometimes occurring near nasal openings)   Negative: Insect bite(s) suspected   Negative: Jock Itch suspected (i.e., itchy rash on inner thighs near genital area)   Negative: Localized lump (or swelling) without redness or rash   Negative: [1] Mpox suspected (e.g., direct skin contact such as sex, recent travel to [place] or [place]) AND [2] symptoms of Mpox (e.g., rash, fever, muscle aches, or swollen lymph nodes)   Negative: [1] At risk for Mpox (men-who-have-sex-with-men) AND [2] possible exposure (e.g., multiple sex partners in past 21 days) AND [3] symptoms of Mpox (e.g., rash, fever, muscle aches, or swollen lymph nodes)   Negative: Poison ivy, oak, or sumac rash suspected (e.g., itchy rash after contact with poison ivy)   Negative: Rash of female genital area  (e.g., labia, vagina, vulva)   Negative: Rash of male genital area (e.g., penis, scrotum)   Negative: Redness of immunization site   Negative: Ringworm suspected (i.e., round pink patch, sometimes looks like ring, usually 1/2 to 1 inch [12-25 mm],  in size, slowly increasing in size)   Negative: Shingles suspected (i.e., painful rash, multiple  small blisters grouped together in one area of body; dermatomal distribution)   Negative: Small spot, skin growth, or mole   Negative: Sores or skin ulcer, not a rash   Negative: Wound infection suspected (i.e., pain, spreading redness, or pus; in a cut, puncture, scrape or sutured wound)   Negative: [1] Localized purple or blood-colored spots or dots AND [2] not from injury or friction AND [3] fever   Negative: Patient sounds very sick or weak to the triager   Negative: [1] Red area or streak AND [2] fever   Negative: [1] Rash is painful to touch AND [2] fever   Negative: [1] Looks infected (spreading redness, pus) AND [2] large red area (> 2 in. or 5 cm)   Negative: [1] Looks infected (spreading redness, pus) AND [2] diabetes mellitus or weak immune system (e.g., HIV positive, cancer chemo, splenectomy, organ transplant, chronic steroids)   Negative: [1] Localized purple or blood-colored spots or dots AND [2] not from injury or friction AND [3] no fever   Negative: [1] Looks infected (spreading redness, pus) AND [2] no fever   Negative: Looks like a boil, infected sore, deep ulcer or other infected rash   Negative: [1] Localized rash is very painful AND [2] no fever   Negative: Genital area rash   Negative: Lyme disease suspected (e.g., bull's eye rash or tick bite / exposure)   Negative: [1] Applying cream or ointment AND [2] causes severe itch, burning or pain   Negative: Medication patch causing local rash or itching   Negative: [1] Pimples (localized) AND [2 ] no improvement after using Care Advice   Negative: Tender bumps in armpits   Negative: [1] Severe localized itching AND [2] after 2 days of steroid cream   Negative: Localized rash present > 7 days   Negative: Red, moist, irritated area between skin folds (or under larger breasts)   Negative: [1] Localized area of skin darkening or thickening on lower legs or ankles AND [2] has NOT been evaluated by a doctor (or NP/PA)    Answer Assessment - Initial  "Assessment Questions  1. APPEARANCE of RASH: \"Describe the rash.\"       Looks like irritation or heat rash from keeping arm down  2. LOCATION: \"Where is the rash located?\"       Right armpit she states   3. NUMBER: \"How many spots are there?\"         4. SIZE: \"How big are the spots?\" (Inches, centimeters or compare to size of a coin)         5. ONSET: \"When did the rash start?\"         6. ITCHING: \"Does the rash itch?\" If Yes, ask: \"How bad is the itch?\"  (Scale 0-10; or none, mild, moderate, severe)      No c/o   7. PAIN: \"Does the rash hurt?\" If Yes, ask: \"How bad is the pain?\"  (Scale 0-10; or none, mild, moderate, severe)     - NONE (0): no pain     - MILD (1-3): doesn't interfere with normal activities      - MODERATE (4-7): interferes with normal activities or awakens from sleep      - SEVERE (8-10): excruciating pain, unable to do any normal activities      More like irritation   8. OTHER SYMPTOMS: \"Do you have any other symptoms?\" (e.g., fever)      No c/o   9. PREGNANCY: \"Is there any chance you are pregnant?\" \"When was your last menstrual period?\"    Protocols used: Rash or Redness - Localized-ADULT-AH    "

## 2023-06-21 ENCOUNTER — TELEPHONE (OUTPATIENT)
Dept: INTERNAL MEDICINE | Facility: CLINIC | Age: 68
End: 2023-06-21

## 2023-06-21 NOTE — TELEPHONE ENCOUNTER
Spoke with patient and let her know we are waiting for a new order to be placed by Dr Benites and central scheduling will call to schedule once order is in

## 2023-06-21 NOTE — TELEPHONE ENCOUNTER
Caller: Sara Lorenz    Relationship to patient: Self    Best call back number: [phone]     Patient is needing: PATIENT WOULD LIKE TO DISCUSS A REFERRAL WITH DR NY'S ASSISTANT. SHE WAS PERSISTENT IN REQUESTING TO SPEAK WITH SOMEONE AT THE OFFICE.

## 2023-06-22 LAB
BACTERIA SPEC ANAEROBE CULT: NORMAL
QT INTERVAL: 430 MS
QTC INTERVAL: 436 MS

## 2023-06-23 ENCOUNTER — TELEPHONE (OUTPATIENT)
Dept: INTERNAL MEDICINE | Facility: CLINIC | Age: 68
End: 2023-06-23

## 2023-06-23 NOTE — TELEPHONE ENCOUNTER
Caller: Kevan Lorenz    Relationship: Self    Best call back number: [phone]     What is the best time to reach you: ANYTIME    Who are you requesting to speak with (clinical staff, provider,  specific staff member): DR NY    Do you know the name of the person who called: KEVAN    What was the call regarding: PATIENT WOULD LIKE TO KNOW WHAT THE HOLD UP IS ON THE CORRODED ARTERY TEST. IF SHE NEEDS TO GO TO [place] SHE WILL GO. SHE IS VERY ANXIOUS TO GET THIS TEST DONE.    Is it okay if the provider responds through MyChart: YES

## 2023-06-23 NOTE — TELEPHONE ENCOUNTER
Per Dr. Kamari Benites, Breanne DAVIS, DO        tHE HOLD UP IS IN RADIOLOGY WITH THE ORDER NOT WITH ME---PLEASE LET PT KNOW     LVM that the hold up was with referral dept scheduling, advised I would speak with Gracia, referral coordinator on Monday to see what we can do from here.

## 2023-06-26 NOTE — TELEPHONE ENCOUNTER
Caller: Sara Lorenz    Relationship: Self    Best call back number: [phone]    What orders are you requesting (i.e. lab or imaging): DUPLEX CORATID ULTRASOUND    In what timeframe would the patient need to come in: AS SOON AS POSSIBLE         Additional notes: THE PATIENT STATES THAT SHE SPOKE TO JASWINDER WITH [facility]EN[facility] SCHEDULING WITH Christian THE PATIENT STATES THAT SHE WAS TOLD THAT THE ORDER WAS ORIGINALLY SENT OVER AS A ULTRASOUND  CORATID BILATERAL THE PATIENT STATES THAT SHE WAS TOLD THAT AT THE [facility] THAT TERMINOLOGY WOULD HAVE BEEN FINE THE PATIENT WANTS TO HAVE THIS DONE IN [place] THE CALLER STATES THAT SHE WAS TOLD THAT THE ORDER NEEDS TO SAY THAT IT IS FOR A DUPLEX CORITED ULTRASOUND

## 2023-07-24 LAB
FUNGUS WND CULT: NORMAL
MYCOBACTERIUM SPEC CULT: NORMAL
NIGHT BLUE STAIN TISS: NORMAL

## 2023-08-03 ENCOUNTER — HOSPITAL ENCOUNTER (OUTPATIENT)
Dept: CARDIOLOGY | Facility: HOSPITAL | Age: 68
Discharge: HOME | End: 2023-08-03
Admitting: FAMILY MEDICINE
Payer: MEDICARE

## 2023-08-03 ENCOUNTER — TELEPHONE (OUTPATIENT)
Dept: INTERNAL MEDICINE | Facility: CLINIC | Age: 68
End: 2023-08-03

## 2023-08-03 VITALS — HEIGHT: 62 IN | WEIGHT: 167.55 LBS | BODY MASS INDEX: 30.83 KG/M2

## 2023-08-03 DIAGNOSIS — R09.89 BRUIT: ICD-10-CM

## 2023-08-03 LAB
BH CV XLRA MEAS LEFT DIST CCA EDV: 18 CM/SEC
BH CV XLRA MEAS LEFT DIST CCA PSV: 70 CM/SEC
BH CV XLRA MEAS LEFT DIST ICA EDV: 19 CM/SEC
BH CV XLRA MEAS LEFT DIST ICA PSV: 54 CM/SEC
BH CV XLRA MEAS LEFT ICA/CCA RATIO: 0.62
BH CV XLRA MEAS LEFT MID CCA EDV: 22 CM/SEC
BH CV XLRA MEAS LEFT MID CCA PSV: 76 CM/SEC
BH CV XLRA MEAS LEFT MID ICA EDV: 10 CM/SEC
BH CV XLRA MEAS LEFT MID ICA PSV: 44 CM/SEC
BH CV XLRA MEAS LEFT PROX CCA EDV: 15.7 CM/SEC
BH CV XLRA MEAS LEFT PROX CCA PSV: 73.9 CM/SEC
BH CV XLRA MEAS LEFT PROX ECA EDV: 16 CM/SEC
BH CV XLRA MEAS LEFT PROX ECA PSV: 116 CM/SEC
BH CV XLRA MEAS LEFT PROX ICA EDV: 12 CM/SEC
BH CV XLRA MEAS LEFT PROX ICA PSV: 47 CM/SEC
BH CV XLRA MEAS LEFT PROX SCLA PSV: 104 CM/SEC
BH CV XLRA MEAS LEFT VERTEBRAL A EDV: 11.1 CM/SEC
BH CV XLRA MEAS LEFT VERTEBRAL A PSV: 37.5 CM/SEC
BH CV XLRA MEAS RIGHT DIST CCA EDV: 18.1 CM/SEC
BH CV XLRA MEAS RIGHT DIST CCA PSV: 65.2 CM/SEC
BH CV XLRA MEAS RIGHT DIST ICA EDV: 22 CM/SEC
BH CV XLRA MEAS RIGHT DIST ICA PSV: 57 CM/SEC
BH CV XLRA MEAS RIGHT ICA/CCA RATIO: 0.84
BH CV XLRA MEAS RIGHT MID CCA EDV: 16.5 CM/SEC
BH CV XLRA MEAS RIGHT MID CCA PSV: 62.9 CM/SEC
BH CV XLRA MEAS RIGHT MID ICA EDV: 20 CM/SEC
BH CV XLRA MEAS RIGHT MID ICA PSV: 52 CM/SEC
BH CV XLRA MEAS RIGHT PROX CCA EDV: 14.1 CM/SEC
BH CV XLRA MEAS RIGHT PROX CCA PSV: 62.9 CM/SEC
BH CV XLRA MEAS RIGHT PROX ECA EDV: 11 CM/SEC
BH CV XLRA MEAS RIGHT PROX ECA PSV: 67 CM/SEC
BH CV XLRA MEAS RIGHT PROX ICA EDV: 15 CM/SEC
BH CV XLRA MEAS RIGHT PROX ICA PSV: 44 CM/SEC
BH CV XLRA MEAS RIGHT PROX SCLA PSV: 140 CM/SEC
BH CV XLRA MEAS RIGHT VERTEBRAL A EDV: 14.1 CM/SEC
BH CV XLRA MEAS RIGHT VERTEBRAL A PSV: 50.3 CM/SEC
LEFT ARM BP: NORMAL MMHG
RIGHT ARM BP: NORMAL MMHG

## 2023-08-03 PROCEDURE — 93880 EXTRACRANIAL BILAT STUDY: CPT

## 2023-08-03 PROCEDURE — 93880 EXTRACRANIAL BILAT STUDY: CPT | Performed by: INTERNAL MEDICINE

## 2023-08-03 NOTE — TELEPHONE ENCOUNTER
LVM TO RTN CALL    HUB TO READ:    PLEASE LET PT KNOW THAT WE WILL SEND THE MSG TO DR. NY BUT THE AROTID BILATERAL EXAM  RESULTS SHOULD BE DISCUSSED  FROM WHOM ORDERED IT AND WHO PERFORMED THE TEST. IF SHE HAS ANY OTHER QUESTIONS

## 2023-08-03 NOTE — TELEPHONE ENCOUNTER
Provider: DR. NY    Caller: KEVAN KRAUS     Phone Number: [phone]     Reason for Call: PATIENT WOULD LIKE DR. NY TO KNOW SHE HAD HER TOTAL REVERSE SHOULDER REPLACEMENT ON 6/12/23 AND SHE HAS BEEN RECOVERING VERY WELL. SHE ALSO HAD CAROTID BILATERAL EXAM DONE TODAY AND THE RESULTS SHOULD BE POSTED WITHIN 24 TO 48 HOURS SO WHEN THOSE COME OUT SHE WOULD LIKE TO KNOW IF SHE CAN GET A CALL TO GO OVER THE RESULTS.    LASTLY SHE WOULD LIKE DR. NY TO KNOW THAT TOMORROW SHE IS GOING TO HAVE LASIK SURGERY ON HER RIGHT EYE AND THEN NEXT FRIDAY SHE WILL HAVE SURGERY ON HER LEFT EYE.

## 2023-08-04 ENCOUNTER — TELEPHONE (OUTPATIENT)
Dept: INTERNAL MEDICINE | Facility: CLINIC | Age: 68
End: 2023-08-04
Payer: MEDICARE

## 2023-09-05 ENCOUNTER — OFFICE VISIT (OUTPATIENT)
Dept: INTERNAL MEDICINE | Facility: CLINIC | Age: 68
End: 2023-09-05
Payer: MEDICARE

## 2023-09-05 ENCOUNTER — HOSPITAL ENCOUNTER (OUTPATIENT)
Dept: GENERAL RADIOLOGY | Facility: HOSPITAL | Age: 68
Discharge: HOME | End: 2023-09-05
Admitting: FAMILY MEDICINE
Payer: MEDICARE

## 2023-09-05 VITALS
TEMPERATURE: 97.3 F | RESPIRATION RATE: 16 BRPM | OXYGEN SATURATION: 96 % | HEART RATE: 81 BPM | SYSTOLIC BLOOD PRESSURE: 122 MMHG | DIASTOLIC BLOOD PRESSURE: 66 MMHG

## 2023-09-05 DIAGNOSIS — M25.561 ACUTE PAIN OF RIGHT KNEE: Primary | ICD-10-CM
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
## 2023-09-05 DIAGNOSIS — R26.89 BALANCE PROBLEM: ICD-10-CM

## 2023-09-05 DIAGNOSIS — M25.561 ACUTE PAIN OF RIGHT KNEE: ICD-10-CM

## 2023-09-05 PROCEDURE — 99213 OFFICE O/P EST LOW 20 MIN: CPT | Performed by: FAMILY MEDICINE

## 2023-09-05 PROCEDURE — 73562 X-RAY EXAM OF KNEE 3: CPT

## 2023-09-05 PROCEDURE — 1159F MED LIST DOCD IN RCRD: CPT | Performed by: FAMILY MEDICINE

## 2023-09-05 PROCEDURE — 3078F DIAST BP <80 MM HG: CPT | Performed by: FAMILY MEDICINE

## 2023-09-05 PROCEDURE — 3074F SYST BP LT 130 MM HG: CPT | Performed by: FAMILY MEDICINE

## 2023-09-05 PROCEDURE — 1160F RVW MEDS BY RX/DR IN RCRD: CPT | Performed by: FAMILY MEDICINE

## 2023-09-05 RX ORDER — GABAPENTIN 100 MG/1
100 CAPSULE ORAL 3 TIMES DAILY
COMMUNITY

## 2023-09-05 NOTE — PROGRESS NOTES
"    Office Note     Name: Sara Lorenz    : 1955     MRN: [id number]     Chief Complaint  Fall (Fell yesterday after tripping over a water hose and hit right knee, unable to put pressure on it or bend it )    Subjective     History of Present Illness:  Sara Lorenz is a 68 y.o. female who presents today for several concerns.  Patient states that she was washing her car yesterday and she tripped over the water hose and landed all her weight on her right knee and scraped it up and now cannot bend it all the way back or straighten it all the way out and cannot bear weight on it without pain.  She is told she will need an x-ray and possibly physical therapy and she asks if she can have some physical therapy for balance issues also.  She states that she has problems with balance at times and she is walking on crutches today to keep from bearing weight on her knee.  She states the knee is swollen also since yesterday.  She did not hit her head or have LOC.    Review of Systems   Respiratory:  Negative for cough, shortness of breath and wheezing.    Cardiovascular:  Negative for chest pain and palpitations.   Gastrointestinal:  Negative for blood in stool, diarrhea and vomiting.   Genitourinary:  Negative for dysuria, flank pain and genital sores.     Objective     Vital Signs  /66   Pulse 81   Temp 97.3 °F (36.3 °C) (Infrared)   Resp 16   SpO2 96%   Estimated body mass index is 30.83 kg/m² as calculated from the following:    Height as of 8/3/23: 157 cm (61.81\").    Weight as of 8/3/23: 76 kg (167 lb 8.8 oz).          Physical Exam  Vitals and nursing note reviewed.   HENT:      Head: Normocephalic and atraumatic.   Neck:      Vascular: No carotid bruit.   Cardiovascular:      Rate and Rhythm: Normal rate and regular rhythm.      Heart sounds: Normal heart sounds. No murmur heard.    No gallop.   Pulmonary:      Effort: Pulmonary effort is normal. No respiratory distress.      Breath sounds: No " stridor. No wheezing, rhonchi or rales.   Musculoskeletal:      Cervical back: Normal range of motion and neck supple.      Right lower leg: Edema present.      Left lower leg: No edema.   Lymphadenopathy:      Cervical: No cervical adenopathy.   Skin:     Comments: Right knee with scraped open skin from fall yesterday   Neurological:      Mental Status: She is alert.               Assessment and Plan     Diagnoses and all orders for this visit:    1. Acute pain of right knee (Primary)  -     XR Knee 3 View Right; Future  -     Ambulatory Referral to Physical Therapy Evaluate and treat    2. Fall, initial encounter    3. Balance problem  -     Ambulatory Referral to Physical Therapy Evaluate and treat          Follow Up  Return in about 3 months (around 12/5/2023) for Recheck.    Breanne Benites, DO

## 2023-09-06 ENCOUNTER — TELEPHONE (OUTPATIENT)
Dept: INTERNAL MEDICINE | Facility: CLINIC | Age: 68
End: 2023-09-06
Payer: MEDICARE

## 2023-09-06 NOTE — TELEPHONE ENCOUNTER
Caller: Sara Lorenz    Relationship: Self    Best call back number: [phone]      What test was performed: XRAY OF HER KNEE    When was the test performed: 09/05/2023      Additional notes: THE PATIENT WOULD LIKE TO KNOW IF THE XRAY WAS REVIEWED AND HOW LONG HER RECOVERY WOULD BE IF SHE STAYS OFF THE KNEE

## 2023-09-08 ENCOUNTER — TELEPHONE (OUTPATIENT)
Dept: INTERNAL MEDICINE | Facility: CLINIC | Age: 68
End: 2023-09-08
Payer: MEDICARE

## 2023-09-08 NOTE — TELEPHONE ENCOUNTER
Called and lvm for patient to return call, office number given.     HUB: If patient returns call please relay providers message:     no fractures found

## 2023-09-12 ENCOUNTER — TELEPHONE (OUTPATIENT)
Dept: INTERNAL MEDICINE | Facility: CLINIC | Age: 68
End: 2023-09-12
Payer: MEDICARE

## 2023-09-12 NOTE — TELEPHONE ENCOUNTER
Caller: Sara Lorenz     Relationship: SELF     Best call back number: [phone]     What is your medical concern? KNEE PAIN FROM FALL    How long has this issue been going on? 5 DAYS AGO    Is your provider already aware of this issue? YES    Have you been treated for this issue? YES    HOW LONG SHOULD SHE APPLY THE COLD PACKS?  SHOULD SHE GET A WALKER?      PLEASE CALL AND ADVISE

## 2023-09-12 NOTE — TELEPHONE ENCOUNTER
Continue ice for a few more days and do not bare weight on it and if she would prefer a walker, then that is fine

## 2023-09-12 NOTE — TELEPHONE ENCOUNTER
Patient is asking how long to apply the ice packs and if she needs to use a walker. Please advise.

## 2023-09-12 NOTE — TELEPHONE ENCOUNTER
Called and spoke with patient, informed her of Dr. Benites's recommendations. She verbalized understanding and had no further questions.

## 2025-02-11 ENCOUNTER — TELEPHONE (OUTPATIENT)
Dept: ONCOLOGY | Facility: CLINIC | Age: 70
End: 2025-02-11
Payer: MEDICARE

## 2025-02-11 NOTE — TELEPHONE ENCOUNTER
Called and left message informing patient that we are waiting on records from [facility] and after Dr. Collins reviews those records then appt will be scheduled and she will be contacted.

## 2025-02-11 NOTE — TELEPHONE ENCOUNTER
Caller: Sara Lorenz    Relationship: Self    Best call back number: [phone]      What was the call regarding: PATIENT STATES HER DOCTOR,  DR MICHAEL AVILA SPOKE TO DOCTOR JONES TODAY ABOUT WORKING HER IN, DID ADVISE THERE WAS NO REFERRAL BUT PATIENT STATES DR JONES WAS GOING TO WORK HER IN

## 2025-02-11 NOTE — TELEPHONE ENCOUNTER
Caller: Sara Lorenz    Relationship: Self    Best call back number: [phone]     What is the best time to reach you: ANYTIME    Who are you requesting to speak with (clinical staff, provider,  specific staff member): CLINICAL    What was the call regarding: PATIENT CALLING AGAIN TO SEE IF APPT HAD BEEN SCHEDULED, WAITING TO HEAR DR JONES DECISION FROM SPEAKING WITH HER .    PLEASE ADVISE.

## 2025-02-12 ENCOUNTER — TELEPHONE (OUTPATIENT)
Dept: ONCOLOGY | Facility: CLINIC | Age: 70
End: 2025-02-12
Payer: MEDICARE

## 2025-02-12 NOTE — TELEPHONE ENCOUNTER
Called patient back and she states that she got a hard copy of her records and can bring them today. Discussed with Susanne and records have not been received yet so patient will bring them today. Informed her Dr. Collins would review the records tomorrow and then she would be contacted with an appt. She verbalized understanding.

## 2025-02-12 NOTE — TELEPHONE ENCOUNTER
Caller: Sara Lorenz    Relationship: Self    Best call back number: [phone]    What is the best time to reach you: ASAP    Who are you requesting to speak with (clinical staff, provider,  specific staff member): HAYLEY     What was the call regarding: PT HAS HARD COPIES OF HER MEDICAL RECORDS & HAS QUESTIONS FOR YOU, PLEASE SEE TE FROM YESTERDAY.    Is it okay if the provider responds through [employer]: NO

## 2025-02-13 ENCOUNTER — LAB (OUTPATIENT)
Dept: LAB | Facility: HOSPITAL | Age: 70
End: 2025-02-13
Payer: MEDICARE

## 2025-02-13 ENCOUNTER — TELEPHONE (OUTPATIENT)
Dept: ONCOLOGY | Facility: CLINIC | Age: 70
End: 2025-02-13

## 2025-02-13 ENCOUNTER — CONSULT (OUTPATIENT)
Dept: ONCOLOGY | Facility: CLINIC | Age: 70
End: 2025-02-13
Payer: MEDICARE

## 2025-02-13 VITALS
HEIGHT: 62 IN | RESPIRATION RATE: 18 BRPM | HEART RATE: 101 BPM | OXYGEN SATURATION: 93 % | TEMPERATURE: 97.3 F | WEIGHT: 169 LBS | SYSTOLIC BLOOD PRESSURE: 124 MMHG | BODY MASS INDEX: 31.1 KG/M2 | DIASTOLIC BLOOD PRESSURE: 70 MMHG

## 2025-02-13 DIAGNOSIS — R79.89 ABNORMAL CBC: ICD-10-CM

## 2025-02-13 DIAGNOSIS — R79.89 ABNORMAL CBC: Primary | ICD-10-CM

## 2025-02-13 LAB
BASOPHILS # BLD AUTO: 0.04 10*3/MM3 (ref 0–0.2)
BASOPHILS NFR BLD AUTO: 0.6 % (ref 0–1.5)
DEPRECATED RDW RBC AUTO: 52.3 FL (ref 37–54)
EOSINOPHIL # BLD AUTO: 0.23 10*3/MM3 (ref 0–0.4)
EOSINOPHIL NFR BLD AUTO: 3.4 % (ref 0.3–6.2)
ERYTHROCYTE [DISTWIDTH] IN BLOOD BY AUTOMATED COUNT: 14.8 % (ref 12.3–15.4)
HCT VFR BLD AUTO: 35.9 % (ref 34–46.6)
HGB BLD-MCNC: 12 G/DL (ref 12–15.9)
IMM GRANULOCYTES # BLD AUTO: 0 10*3/MM3 (ref 0–0.05)
IMM GRANULOCYTES NFR BLD AUTO: 0 % (ref 0–0.5)
LYMPHOCYTES # BLD AUTO: 2.29 10*3/MM3 (ref 0.7–3.1)
LYMPHOCYTES NFR BLD AUTO: 34.2 % (ref 19.6–45.3)
MCH RBC QN AUTO: 32.1 PG (ref 26.6–33)
MCHC RBC AUTO-ENTMCNC: 33.4 G/DL (ref 31.5–35.7)
MCV RBC AUTO: 96 FL (ref 79–97)
MONOCYTES # BLD AUTO: 0.83 10*3/MM3 (ref 0.1–0.9)
MONOCYTES NFR BLD AUTO: 12.4 % (ref 5–12)
NEUTROPHILS NFR BLD AUTO: 3.31 10*3/MM3 (ref 1.7–7)
NEUTROPHILS NFR BLD AUTO: 49.4 % (ref 42.7–76)
PLATELET # BLD AUTO: 372 10*3/MM3 (ref 140–450)
PMV BLD AUTO: 9 FL (ref 6–12)
RBC # BLD AUTO: 3.74 10*6/MM3 (ref 3.77–5.28)
WBC NRBC COR # BLD AUTO: 6.7 10*3/MM3 (ref 3.4–10.8)

## 2025-02-13 PROCEDURE — 36415 COLL VENOUS BLD VENIPUNCTURE: CPT

## 2025-02-13 PROCEDURE — 85025 COMPLETE CBC W/AUTO DIFF WBC: CPT

## 2025-02-13 PROCEDURE — 85060 BLOOD SMEAR INTERPRETATION: CPT

## 2025-02-13 RX ORDER — DESVENLAFAXINE 25 MG/1
25 TABLET, EXTENDED RELEASE ORAL DAILY
COMMUNITY
Start: 2025-02-11

## 2025-02-13 RX ORDER — FLUTICASONE PROPIONATE AND SALMETEROL 100; 50 UG/1; UG/1
1 POWDER RESPIRATORY (INHALATION)
COMMUNITY
Start: 2025-01-29

## 2025-02-13 RX ORDER — DIAZEPAM 5 MG/1
5 TABLET ORAL TAKE AS DIRECTED
COMMUNITY
Start: 2025-02-11
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
## 2025-02-13 NOTE — TELEPHONE ENCOUNTER
Caller: Sara Lorenz    Relationship: Self    Best call back number: [phone]    What is the best time to reach you: ANYTIME    Who are you requesting to speak with (clinical staff, provider,  specific staff member): CLINICAL    What was the call regarding: PT IS REQUESTING A CALL BACK TO CLARIFY SOME QUESTIONS SHE HAS REGARDING HER APPT SHE HAD THIS MORNING WITH DR JONES     PLEASE ADVISE
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
## 2025-02-14 ENCOUNTER — TELEPHONE (OUTPATIENT)
Dept: ONCOLOGY | Facility: CLINIC | Age: 70
End: 2025-02-14
Payer: MEDICARE

## 2025-02-14 LAB
CYTOLOGIST CVX/VAG CYTO: NORMAL
PATH INTERP BLD-IMP: NORMAL

## 2025-02-14 NOTE — TELEPHONE ENCOUNTER
Called patient back and she asked how long the peripheral smear will take to come back, if it is typical for these abnormalities to show up this far out from a splenectomy, and also if she should see a rheumatologist relative to the elevation in ARTEM. Informed patient that the peripheral smear will likely result out next week and if any sinister abnormality Dr. Collins will have RN call patient if not he will review results at her follow up, he did say in his note that due to the ARTEM elevation her PCP may want to refer her to a rheumatologist, RN asked Dr. Collins if it is typical for the abnormalities to show up this far out from splenectomy and he states it is. Patient verbalized understanding.

## 2025-02-14 NOTE — TELEPHONE ENCOUNTER
Dr. Collins received results, patients CBC is normal and Psmear showed nothing worrisome. He states that she does not need to keep follow up unless she would like to see him back to review in more detail. Patient verbalized understanding. She will call us back if she decides to cancel but wants to think about it.    This encounter is to enter renewal Service to  [facility] and Pharmacist Chronic Disease Management ([place]) order, which will be sent to referring provider for signature (order mode: Cosign Required). The CDM Team Clinician communicated with referring provider to establish this order as a part of the treatment plan.